# Patient Record
Sex: FEMALE | Race: WHITE | NOT HISPANIC OR LATINO | Employment: OTHER | URBAN - METROPOLITAN AREA
[De-identification: names, ages, dates, MRNs, and addresses within clinical notes are randomized per-mention and may not be internally consistent; named-entity substitution may affect disease eponyms.]

---

## 2017-02-15 ENCOUNTER — TELEPHONE (OUTPATIENT)
Dept: FAMILY MEDICINE | Facility: OTHER | Age: 52
End: 2017-02-15

## 2017-02-15 NOTE — TELEPHONE ENCOUNTER
Summary:    Patient is due/failing the following:   COLONOSCOPY and MAMMOGRAM    Action needed:   Schedule a mammogram and colonoscopy or complete a FIT test    Type of outreach:    phone no answer or voicemail. Reminder letter sent.     Questions for provider review:    None                                                                                                                                    Tawana Madden       Chart routed to Care Team .        Panel Management Review      Patient has the following on her problem list: None      Composite cancer screening  Chart review shows that this patient is due/due soon for the following Mammogram and Colonoscopy

## 2017-02-15 NOTE — LETTER
[facility]  [address]   [facility] [address]  Phone: [phone]  February 15, 2017      Chelo Lopez  [address] [place] [address]      Dear Chelo,    We care about your health and have reviewed your health plan including your medical conditions, medications, and lab results.  Based on this review, it is recommended that you follow up regarding the following health topic(s):  -Breast Cancer Screening  -Colon Cancer Screening    We recommend you take the following action(s):  -schedule a MAMMOGRAM which is due. Please disregard this reminder if you have had this exam elsewhere within the last 1-2 years please let us know so we can update your records.  -schedule a COLONOSCOPY to look for colon cancer (due every 10 years or 5 years in higher risk situations.)  Colonoscopies can prevent 90-95% of colon cancer deaths.  Problem lesions can be removed before they ever become cancer.  If you do not wish to do a colonoscopy or cannot afford to do one at this time, there is another option called a Fecal Immunochemical Occult Blood Test (FIT) a take home stool sample kit.  It does not replace the colonoscopy for colorectal cancer screening, but it can detect hidden bleeding in the lower colon.  It does need to be repeated every year and if a positive result is obtained, you would be referred for a colonoscopy.  If you have completed either one of these tests at another facility, please have the records sent to our clinic for our records.     Please call us at the [facility] - [phone] (or use [employer]) to address the above recommendations.     Thank you for trusting [facility] and we appreciate the opportunity to serve you.  We look forward to supporting your healthcare needs in the future.    Healthy Regards,    Your Health Care Team  [facility]

## 2017-05-08 NOTE — PROGRESS NOTES
"  SUBJECTIVE:                                                    Chelo Lopez is a 52 year old female who presents to clinic today for the following health issues:  {Provider please address medication reconciliation discrepancies--rooming staff please delete if no med/rec issues}    HPI    {additional problems for roomer to add, delete if none:389017}    Problem list and histories reviewed & adjusted, as indicated.  Additional history: {NONE - AS DOCUMENTED:454883::\"as documented\"}    {ACUTE Problem SUPERLIST - brief histories:031232}    {HIST REVIEW/ LINKS 2:722297}    {PROVIDER CHARTING PREFERENCE:066611}  "

## 2017-05-10 ENCOUNTER — OFFICE VISIT (OUTPATIENT)
Dept: FAMILY MEDICINE | Facility: OTHER | Age: 52
End: 2017-05-10
Payer: MEDICAID

## 2017-05-10 VITALS
OXYGEN SATURATION: 97 % | HEIGHT: 65 IN | HEART RATE: 74 BPM | TEMPERATURE: 98.8 F | WEIGHT: 169 LBS | RESPIRATION RATE: 16 BRPM | SYSTOLIC BLOOD PRESSURE: 108 MMHG | BODY MASS INDEX: 28.16 KG/M2 | DIASTOLIC BLOOD PRESSURE: 62 MMHG

## 2017-05-10 DIAGNOSIS — F90.2 ATTENTION DEFICIT HYPERACTIVITY DISORDER (ADHD), COMBINED TYPE: Primary | ICD-10-CM

## 2017-05-10 DIAGNOSIS — Z12.31 VISIT FOR SCREENING MAMMOGRAM: ICD-10-CM

## 2017-05-10 DIAGNOSIS — Z12.11 SCREEN FOR COLON CANCER: ICD-10-CM

## 2017-05-10 PROCEDURE — 99214 OFFICE O/P EST MOD 30 MIN: CPT | Performed by: FAMILY MEDICINE

## 2017-05-10 RX ORDER — IBUPROFEN 600 MG/1
TABLET, FILM COATED ORAL
COMMUNITY
Start: 2017-05-05 | End: 2017-06-28

## 2017-05-10 RX ORDER — POLYETHYLENE GLYCOL 3350 17 G/17G
POWDER, FOR SOLUTION ORAL
COMMUNITY
Start: 2017-05-05 | End: 2017-06-28

## 2017-05-10 RX ORDER — OXYCODONE HYDROCHLORIDE 5 MG/1
TABLET ORAL
COMMUNITY
Start: 2017-05-05 | End: 2017-06-28

## 2017-05-10 RX ORDER — MINERAL OIL/PETROLATUM,WHITE 41.5-56.8%
OINTMENT (GRAM) OPHTHALMIC (EYE)
COMMUNITY
Start: 2017-05-05 | End: 2017-06-28

## 2017-05-10 RX ORDER — DEXTROAMPHETAMINE SACCHARATE, AMPHETAMINE ASPARTATE MONOHYDRATE, DEXTROAMPHETAMINE SULFATE AND AMPHETAMINE SULFATE 7.5; 7.5; 7.5; 7.5 MG/1; MG/1; MG/1; MG/1
30 CAPSULE, EXTENDED RELEASE ORAL DAILY
Qty: 30 CAPSULE | Refills: 0 | Status: SHIPPED | OUTPATIENT
Start: 2017-05-10 | End: 2017-05-31

## 2017-05-10 ASSESSMENT — PAIN SCALES - GENERAL: PAINLEVEL: NO PAIN (0)

## 2017-05-10 NOTE — MR AVS SNAPSHOT
"              After Visit Summary   5/10/2017    Chelo Lopez    MRN: [id number]           Patient Information     Date Of Birth          1965        Visit Information        Provider Department      5/10/2017 4:45 PM More Lao MD [facility]        Today's Diagnoses     Attention deficit hyperactivity disorder (ADHD), combined type    -  1    Screen for colon cancer        Visit for screening mammogram           Follow-ups after your visit        Who to contact     If you have questions or need follow up information about today's clinic visit or your schedule please contact [facility] directly at [phone].  Normal or non-critical lab and imaging results will be communicated to you by [employer], letter or phone within 4 business days after the clinic has received the results. If you do not hear from us within 7 days, please contact the clinic through [employer] or phone. If you have a critical or abnormal lab result, we will notify you by phone as soon as possible.  Submit refill requests through [employer] or call your pharmacy and they will forward the refill request to us. Please allow 3 business days for your refill to be completed.          Additional Information About Your Visit        MyChart Information     [employer] gives you secure access to your electronic health record. If you see a primary care provider, you can also send messages to your care team and make appointments. If you have questions, please call your primary care clinic.  If you do not have a primary care provider, please call [phone] and they will assist you.        Care EveryWhere ID     This is your Care EveryWhere ID. This could be used by other organizations to access your [place] medical records  [id number]        Your Vitals Were     Pulse Temperature Respirations Height Last Period Pulse Oximetry    74 98.8  F (37.1  C) (Temporal) 16 5' 4.53\" (1.639 m) 03/07/2011 97%    BMI (Body Mass " Index)                   28.54 kg/m2            Blood Pressure from Last 3 Encounters:   05/10/17 108/62   09/19/16 94/60   04/27/16 106/70    Weight from Last 3 Encounters:   05/10/17 169 lb (76.7 kg)   09/19/16 164 lb (74.4 kg)   04/27/16 163 lb (73.9 kg)              Today, you had the following     No orders found for display         Today's Medication Changes          These changes are accurate as of: 5/10/17  5:18 PM.  If you have any questions, ask your nurse or doctor.               These medicines have changed or have updated prescriptions.        Dose/Directions    * amphetamine-dextroamphetamine 20 MG per tablet   Commonly known as:  ADDERALL   This may have changed:    - Another medication with the same name was added. Make sure you understand how and when to take each.  - Another medication with the same name was removed. Continue taking this medication, and follow the directions you see here.   Changed by:  More Lao MD        Dose:  20 mg   Take 1 tablet (20 mg) by mouth daily   Quantity:  10 tablet   Refills:  0       * amphetamine-dextroamphetamine 30 MG per 24 hr capsule   Commonly known as:  ADDERALL XR   This may have changed:  You were already taking a medication with the same name, and this prescription was added. Make sure you understand how and when to take each.   Used for:  Attention deficit hyperactivity disorder (ADHD), combined type   Replaces:  amphetamine-dextroamphetamine 10 MG per tablet   Changed by:  More Lao MD        Dose:  30 mg   Take 1 capsule (30 mg) by mouth daily   Quantity:  30 capsule   Refills:  0       * Notice:  This list has 2 medication(s) that are the same as other medications prescribed for you. Read the directions carefully, and ask your doctor or other care provider to review them with you.      Stop taking these medicines if you haven't already. Please contact your care team if you have questions.     amphetamine-dextroamphetamine 10 MG per  tablet   Commonly known as:  ADDERALL   Replaced by:  amphetamine-dextroamphetamine 30 MG per 24 hr capsule   You also have another medication with the same name that you need to continue taking as instructed.   Stopped by:  More Lao MD                Where to get your medicines      Some of these will need a paper prescription and others can be bought over the counter.  Ask your nurse if you have questions.     Bring a paper prescription for each of these medications     amphetamine-dextroamphetamine 30 MG per 24 hr capsule                Primary Care Provider Office Phone # Fax #    More Lao [phone]       [facility]  [address] [facility] [address]        Thank you!     Thank you for choosing [facility]  for your care. Our goal is always to provide you with excellent care. Hearing back from our patients is one way we can continue to improve our services. Please take a few minutes to complete the written survey that you may receive in the mail after your visit with us. Thank you!             Your Updated Medication List - Protect others around you: Learn how to safely use, store and throw away your medicines at www.disposemymeds.org.          This list is accurate as of: 5/10/17  5:18 PM.  Always use your most recent med list.                   Brand Name Dispense Instructions for use    * amphetamine-dextroamphetamine 20 MG per tablet    ADDERALL    10 tablet    Take 1 tablet (20 mg) by mouth daily       * amphetamine-dextroamphetamine 30 MG per 24 hr capsule    ADDERALL XR    30 capsule    Take 1 capsule (30 mg) by mouth daily       ibuprofen 600 MG tablet    ADVIL/MOTRIN         MAPAP 500 MG tablet   Generic drug:  acetaminophen          oxyCODONE 5 MG IR tablet    ROXICODONE         polyethylene glycol powder    MIRALAX/GLYCOLAX         SENNA-S 8.6-50 MG per tablet   Generic drug:  senna-docusate          * Notice:  This list has 2  medication(s) that are the same as other medications prescribed for you. Read the directions carefully, and ask your doctor or other care provider to review them with you.

## 2017-05-10 NOTE — PROGRESS NOTES
SUBJECTIVE:  Chelo is here today to recheck ADHD/ADD.    Updates since last visit: Haven't taken medication for a while due to no insurance (about 3-4 months)    Routine for taking medicine, including time: Not taking now  Time medicine wears off: Not taking  Issues at school: N/A  Issues at home: None  Control of symptoms: No    Side effects:  Headaches: N/A  Stomach aches: N/A  Irritability/mood swings: N/A  Difficulties with sleep: N/A  Social withdrawal: N/A  Unusual movements/tics: N/A  Decreased appetite: N/A      Other concerns: None    Patient Active Problem List   Diagnosis     Other disorder of menstruation and other abnormal bleeding from female genital tract     Tobacco use disorder     PMDD (premenstrual dysphoric disorder)     Menorrhagia     CARDIOVASCULAR SCREENING; LDL GOAL LESS THAN 160     ADHD (attention deficit hyperactivity disorder)     Osteoarthritis of hip       Past Medical History:   Diagnosis Date     Other disorder of menstruation and other abnormal bleeding from female genital tract      Tobacco use disorder        Past Surgical History:   Procedure Laterality Date     ARTHROPLASTY HIP  5/2/2012    Procedure:ARTHROPLASTY HIP; Right Total Hip Arthroplasty; Surgeon:DAGOBERTO CAMPBELL; [facility]     ARTHROPLASTY HIP Left 1/12/2015    Procedure: ARTHROPLASTY HIP;  Surgeon: Dagoberto Campbell MD;  Location:  OR      NONSPECIFIC PROCEDURE  2001    cervical cone procedure?     C NONSPECIFIC PROCEDURE      rt salpingectomy (uncertain if oophorectomy)     COSMETIC MAMMOPLASTY AUGMENTATION BILATERAL      implants     CYSTOSCOPY  3/14/2011    CYSTOSCOPY performed by RICHARD MACKEY at  OR     HYSTERECTOMY  2011    menorrhagia     HYSTERECTOMY, PAP NO LONGER INDICATED       LAPAROSCOPIC ASSISTED HYSTERECTOMY VAGINAL  3/14/2011    LAPAROSCOPIC ASSISTED HYSTERECTOMY VAGINAL performed by RICHARD MACKEY at  OR     LAPAROSCOPY DIAGNOSTIC (GYN)  3/14/2011    LAPAROSCOPY DIAGNOSTIC  "(GYN) performed by RICHARD MACKEY at  OR     SURGICAL HISTORY OF -   1985    Laparotomy for the fallopian tube abscess        Current Outpatient Prescriptions   Medication     MAPAP 500 MG tablet     ibuprofen (ADVIL/MOTRIN) 600 MG tablet     oxyCODONE (ROXICODONE) 5 MG IR tablet     polyethylene glycol (MIRALAX/GLYCOLAX) powder     SENNA-S 8.6-50 MG per tablet     amphetamine-dextroamphetamine (ADDERALL XR) 30 MG per 24 hr capsule     [CANCELED] amphetamine-dextroamphetamine (ADDERALL) 20 MG tablet     [DISCONTINUED] amphetamine-dextroamphetamine (ADDERALL) 10 MG tablet     [DISCONTINUED] amphetamine-dextroamphetamine (ADDERALL) 20 MG tablet     No current facility-administered medications for this visit.        OBJECTIVE:  /62 (BP Location: Right arm, Patient Position: Chair, Cuff Size: Adult Regular)  Pulse 74  Temp 98.8  F (37.1  C) (Temporal)  Resp 16  Ht 5' 4.53\" (1.639 m)  Wt 169 lb (76.7 kg)  LMP 03/07/2011  SpO2 97%  BMI 28.54 kg/m2  Gen: alert, awake, NAD  Lungs: CTA russ  Heart: RR without murmur  Psych: mood normal, focus poor and easily off topic      ASSESSMENT:  1. Attention deficit hyperactivity disorder (ADHD), combined type    2. Screen for colon cancer    3. Visit for screening mammogram        PLAN:  Patient Instructions   -use a timer/alarm for your medication.  -30 XR, follow up in a month.    Discussed another trial of XR as this would better cover for her full day.  Would do well with reminder to help with control.  As this is a med restart, needs to f/u in 1 month.  F/u scheduled today.      The information in this document, created by the medical scribe for me, accurately reflects the services I personally performed and the decisions made by me. I have reviewed and approved this document for accuracy.   MD More Escalante MD      "

## 2017-05-10 NOTE — NURSING NOTE
"Chief Complaint   Patient presents with     Recheck Medication     adderall     Panel Management     mammo, colon cancer, height       Initial /62 (BP Location: Right arm, Patient Position: Chair, Cuff Size: Adult Regular)  Pulse 74  Temp 98.8  F (37.1  C) (Temporal)  Resp 16  Ht 5' 4.53\" (1.639 m)  Wt 169 lb (76.7 kg)  LMP 03/07/2011  SpO2 97%  BMI 28.54 kg/m2 Estimated body mass index is 28.54 kg/(m^2) as calculated from the following:    Height as of this encounter: 5' 4.53\" (1.639 m).    Weight as of this encounter: 169 lb (76.7 kg).  Medication Reconciliation: complete   Imani Vera CMA      "

## 2017-05-25 NOTE — PROGRESS NOTES
SUBJECTIVE:                                                    Chelo Lopez is a 52 year old female who presents to clinic today for the following health issues:      HPI  SUBJECTIVE:  Chelo is here today to recheck ADHD/ADD.    Updates since last visit: everything     Routine for taking medicine, including time: 10:00am  Time medicine wears off: unsure, gradually wears off  Issues at school: no  Issues at home: no  Control of symptoms: not sure    Side effects:  Headaches: No  Stomach aches: No  Irritability/mood swings: Yes- not as excited or creative about certain activities.  Difficulties with sleep: Yes -Is not feeling rested in the morning, sleep is poor due to broken ribs.  Social withdrawal: No  Unusual movements/tics: No  Decreased appetite: No    Other concerns: The patient is still experiencing pain with her broken ribs.    Patient Active Problem List   Diagnosis     Other disorder of menstruation and other abnormal bleeding from female genital tract     Tobacco use disorder     PMDD (premenstrual dysphoric disorder)     Menorrhagia     CARDIOVASCULAR SCREENING; LDL GOAL LESS THAN 160     ADHD (attention deficit hyperactivity disorder)     Osteoarthritis of hip       Past Medical History:   Diagnosis Date     Other disorder of menstruation and other abnormal bleeding from female genital tract      Tobacco use disorder        Past Surgical History:   Procedure Laterality Date     ARTHROPLASTY HIP  5/2/2012    Procedure:ARTHROPLASTY HIP; Right Total Hip Arthroplasty; Surgeon:DAGOBERTO CAMPBELL; [facility]     ARTHROPLASTY HIP Left 1/12/2015    Procedure: ARTHROPLASTY HIP;  Surgeon: Dagoberto Campbell MD;  Location:  OR      NONSPECIFIC PROCEDURE  2001    cervical cone procedure?     C NONSPECIFIC PROCEDURE      rt salpingectomy (uncertain if oophorectomy)     COSMETIC MAMMOPLASTY AUGMENTATION BILATERAL      implants     CYSTOSCOPY  3/14/2011    CYSTOSCOPY performed by [facility]ARD MACKEY at  [facility]     HYSTERECTOMY  2011    menorrhagia     HYSTERECTOMY, PAP NO LONGER INDICATED       LAPAROSCOPIC ASSISTED HYSTERECTOMY VAGINAL  3/14/2011    LAPAROSCOPIC ASSISTED HYSTERECTOMY VAGINAL performed by RICHARD MACKEY at  OR     LAPAROSCOPY DIAGNOSTIC (GYN)  3/14/2011    LAPAROSCOPY DIAGNOSTIC (GYN) performed by RICHARD MACKEY at  OR     SURGICAL HISTORY OF -   1985    Laparotomy for the fallopian tube abscess        Current Outpatient Prescriptions   Medication     amphetamine-dextroamphetamine (ADDERALL XR) 20 MG per 24 hr capsule     MAPAP 500 MG tablet     ibuprofen (ADVIL/MOTRIN) 600 MG tablet     oxyCODONE (ROXICODONE) 5 MG IR tablet     polyethylene glycol (MIRALAX/GLYCOLAX) powder     SENNA-S 8.6-50 MG per tablet     [DISCONTINUED] amphetamine-dextroamphetamine (ADDERALL XR) 30 MG per 24 hr capsule     [CANCELED] amphetamine-dextroamphetamine (ADDERALL) 20 MG tablet     No current facility-administered medications for this visit.        OBJECTIVE:  /86 (BP Location: Left arm, Patient Position: Chair, Cuff Size: Adult Regular)  Pulse 76  Temp 98.7  F (37.1  C) (Temporal)  Resp 16  Ht 1.638 m (5' 4.5\")  Wt 76.2 kg (168 lb)  LMP 03/07/2011  BMI 28.39 kg/m2  Gen: alert, awake, NAD  Lungs: CTA russ  Heart: RR without murmur  PSYCH: Little bit of fatigue and decreased creativity and focus noted today. Alert and oriented times 3; speech- coherent , normal rate and volume; able to articulate logical thoughts, able to abstract reason, no tangential thoughts, no hallucinations or delusions, affect- normal     ASSESSMENT:  1. Attention deficit hyperactivity disorder (ADHD), combined type    2. Screen for colon cancer    3. PMDD (premenstrual dysphoric disorder)    4. Tobacco use disorder      Still not finding improvement in focus with these meds.  Mostly she complains of \"not feeling\" the meds as they go on and off.  Also mentions many projects she is having trouble completing.  Will " increase dose to try to help.  She is still not sure about this long acting med choice, but admits she has trouble remembering BID and she has been waking at night due to pain with the rib fractures, so lack of sleep may be impacting focus.    PLAN:  Patient Instructions   -Follow up in a month.      The information in this document, created by the medical scribe for me, accurately reflects the services I personally performed and the decisions made by me. I have reviewed and approved this document for accuracy.   MD More Escalante MD

## 2017-05-31 ENCOUNTER — OFFICE VISIT (OUTPATIENT)
Dept: FAMILY MEDICINE | Facility: OTHER | Age: 52
End: 2017-05-31
Payer: MEDICAID

## 2017-05-31 VITALS
HEART RATE: 76 BPM | WEIGHT: 168 LBS | RESPIRATION RATE: 16 BRPM | DIASTOLIC BLOOD PRESSURE: 86 MMHG | TEMPERATURE: 98.7 F | SYSTOLIC BLOOD PRESSURE: 132 MMHG | HEIGHT: 65 IN | BODY MASS INDEX: 27.99 KG/M2

## 2017-05-31 DIAGNOSIS — Z12.11 SCREEN FOR COLON CANCER: ICD-10-CM

## 2017-05-31 DIAGNOSIS — F32.81 PMDD (PREMENSTRUAL DYSPHORIC DISORDER): ICD-10-CM

## 2017-05-31 DIAGNOSIS — F17.200 TOBACCO USE DISORDER: ICD-10-CM

## 2017-05-31 DIAGNOSIS — F90.2 ATTENTION DEFICIT HYPERACTIVITY DISORDER (ADHD), COMBINED TYPE: Primary | ICD-10-CM

## 2017-05-31 PROCEDURE — 99214 OFFICE O/P EST MOD 30 MIN: CPT | Performed by: FAMILY MEDICINE

## 2017-05-31 RX ORDER — DEXTROAMPHETAMINE SACCHARATE, AMPHETAMINE ASPARTATE MONOHYDRATE, DEXTROAMPHETAMINE SULFATE AND AMPHETAMINE SULFATE 5; 5; 5; 5 MG/1; MG/1; MG/1; MG/1
40 CAPSULE, EXTENDED RELEASE ORAL DAILY
Qty: 60 CAPSULE | Refills: 0 | Status: SHIPPED | OUTPATIENT
Start: 2017-05-31 | End: 2017-06-28

## 2017-05-31 ASSESSMENT — PAIN SCALES - GENERAL: PAINLEVEL: NO PAIN (0)

## 2017-05-31 NOTE — NURSING NOTE
"Chief Complaint   Patient presents with     Recheck Medication     adderall     Panel Management     Colonoscopy       Initial /86 (BP Location: Left arm, Patient Position: Chair, Cuff Size: Adult Regular)  Pulse 76  Temp 98.7  F (37.1  C) (Temporal)  Resp 16  Ht 5' 4.5\" (1.638 m)  Wt 168 lb (76.2 kg)  LMP 03/07/2011  BMI 28.39 kg/m2 Estimated body mass index is 28.39 kg/(m^2) as calculated from the following:    Height as of this encounter: 5' 4.5\" (1.638 m).    Weight as of this encounter: 168 lb (76.2 kg).  Medication Reconciliation: complete  "

## 2017-05-31 NOTE — MR AVS SNAPSHOT
After Visit Summary   5/31/2017    Chelo Lopez    MRN: [id number]           Patient Information     Date Of Birth          1965        Visit Information        Provider Department      5/31/2017 2:30 PM More Lao MD [facility]        Today's Diagnoses     Attention deficit hyperactivity disorder (ADHD), combined type    -  1    Screen for colon cancer        PMDD (premenstrual dysphoric disorder)        Tobacco use disorder          Care Instructions    -Follow up in a month.          Follow-ups after your visit        Additional Services     GASTROENTEROLOGY ADULT REF PROCEDURE ONLY       Last Lab Result: Creatinine (mg/dL)       Date                     Value                 01/15/2015               0.74             ----------  There is no height or weight on file to calculate BMI.     Needed:  No  Language:  English    Patient will be contacted to schedule procedure.     Please be aware that coverage of these services is subject to the terms and limitations of your health insurance plan.  Call member services at your health plan with any benefit or coverage questions.  Any procedures must be performed at a [facility] facility OR coordinated by your clinic's referral office.    Please bring the following with you to your appointment:    (1) Any X-Rays, CTs or MRIs which have been performed.  Contact the facility where they were done to arrange for  prior to your scheduled appointment.    (2) List of current medications   (3) This referral request   (4) Any documents/labs given to you for this referral                  Your next 10 appointments already scheduled     Jun 28, 2017  2:30 PM CDT   Office Visit with More Lao MD   [facility]    [address] [place] [id number]   [phone]           Bring a current list of meds and any records pertaining to this visit.  For Physicals,  "please bring immunization records and any forms needing to be filled out.  Please arrive 10 minutes early to complete paperwork.              Who to contact     If you have questions or need follow up information about today's clinic visit or your schedule please contact [facility] directly at [phone].  Normal or non-critical lab and imaging results will be communicated to you by MyChart, letter or phone within 4 business days after the clinic has received the results. If you do not hear from us within 7 days, please contact the clinic through WatchFroghart or phone. If you have a critical or abnormal lab result, we will notify you by phone as soon as possible.  Submit refill requests through Agilvax or call your pharmacy and they will forward the refill request to us. Please allow 3 business days for your refill to be completed.          Additional Information About Your Visit        MyChart Information     Agilvax gives you secure access to your electronic health record. If you see a primary care provider, you can also send messages to your care team and make appointments. If you have questions, please call your primary care clinic.  If you do not have a primary care provider, please call [phone] and they will assist you.        Care EveryWhere ID     This is your Care EveryWhere ID. This could be used by other organizations to access your [facility] medical records  [id number]        Your Vitals Were     Pulse Temperature Respirations Height Last Period BMI (Body Mass Index)    76 98.7  F (37.1  C) (Temporal) 16 5' 4.5\" (1.638 m) 03/07/2011 28.39 kg/m2       Blood Pressure from Last 3 Encounters:   05/31/17 132/86   05/10/17 108/62   09/19/16 94/60    Weight from Last 3 Encounters:   05/31/17 168 lb (76.2 kg)   05/10/17 169 lb (76.7 kg)   09/19/16 164 lb (74.4 kg)              We Performed the Following     GASTROENTEROLOGY ADULT REF PROCEDURE ONLY          Today's Medication Changes        "   These changes are accurate as of: 5/31/17  3:23 PM.  If you have any questions, ask your nurse or doctor.               These medicines have changed or have updated prescriptions.        Dose/Directions    * amphetamine-dextroamphetamine 20 MG per tablet   Commonly known as:  ADDERALL   This may have changed:    - Another medication with the same name was added. Make sure you understand how and when to take each.  - Another medication with the same name was removed. Continue taking this medication, and follow the directions you see here.   Changed by:  More Lao MD        Dose:  20 mg   Take 1 tablet (20 mg) by mouth daily   Quantity:  10 tablet   Refills:  0       * amphetamine-dextroamphetamine 20 MG per 24 hr capsule   Commonly known as:  ADDERALL XR   This may have changed:  You were already taking a medication with the same name, and this prescription was added. Make sure you understand how and when to take each.   Used for:  Attention deficit hyperactivity disorder (ADHD), combined type   Replaces:  amphetamine-dextroamphetamine 30 MG per 24 hr capsule   Changed by:  More Lao MD        Dose:  40 mg   Take 2 capsules (40 mg) by mouth daily   Quantity:  60 capsule   Refills:  0       * Notice:  This list has 2 medication(s) that are the same as other medications prescribed for you. Read the directions carefully, and ask your doctor or other care provider to review them with you.      Stop taking these medicines if you haven't already. Please contact your care team if you have questions.     amphetamine-dextroamphetamine 30 MG per 24 hr capsule   Commonly known as:  ADDERALL XR   Replaced by:  amphetamine-dextroamphetamine 20 MG per 24 hr capsule   You also have another medication with the same name that you need to continue taking as instructed.   Stopped by:  More Lao MD                Where to get your medicines      Some of these will need a paper prescription and others can be  bought over the counter.  Ask your nurse if you have questions.     Bring a paper prescription for each of these medications     amphetamine-dextroamphetamine 20 MG per 24 hr capsule                Primary Care Provider Office Phone # Fax #    More Lao [phone]       [facility]  [address] [facility] [address]        Thank you!     Thank you for choosing [facility]  for your care. Our goal is always to provide you with excellent care. Hearing back from our patients is one way we can continue to improve our services. Please take a few minutes to complete the written survey that you may receive in the mail after your visit with us. Thank you!             Your Updated Medication List - Protect others around you: Learn how to safely use, store and throw away your medicines at www.disposemymeds.org.          This list is accurate as of: 5/31/17  3:23 PM.  Always use your most recent med list.                   Brand Name Dispense Instructions for use    * amphetamine-dextroamphetamine 20 MG per tablet    ADDERALL    10 tablet    Take 1 tablet (20 mg) by mouth daily       * amphetamine-dextroamphetamine 20 MG per 24 hr capsule    ADDERALL XR    60 capsule    Take 2 capsules (40 mg) by mouth daily       ibuprofen 600 MG tablet    ADVIL/MOTRIN         MAPAP 500 MG tablet   Generic drug:  acetaminophen          oxyCODONE 5 MG IR tablet    ROXICODONE         polyethylene glycol powder    MIRALAX/GLYCOLAX         SENNA-S 8.6-50 MG per tablet   Generic drug:  senna-docusate          * Notice:  This list has 2 medication(s) that are the same as other medications prescribed for you. Read the directions carefully, and ask your doctor or other care provider to review them with you.

## 2017-06-26 NOTE — PROGRESS NOTES
SUBJECTIVE:                                                    Chelo Lopez is a 52 year old female who presents to clinic today for the following health issues:    HPI  SUBJECTIVE:  Chelo is here today to recheck ADHD/ADD.    Updates since last visit: Patient states she feels like this new dose two 20mg tablets daily is working better.    Routine for taking medicine, including time: Patient takes the two 20 mg tablets around 10 am every morning.  Time medicine wears off: Patient states she can't really tell. She thinks around 8 or 9 pm.  Issues at home: none  Control of symptoms: Working better    Side effects:  Headaches: No  Stomach aches: No  Irritability/mood swings: No  Difficulties with sleep: No  Social withdrawal: No  Unusual movements/tics: No  Decreased appetite: No    Other concerns: none    PSYCH: The patient reports that her increased Adderall dosage has been helping improve her ADHD symptoms.    Patient Active Problem List   Diagnosis     Other disorder of menstruation and other abnormal bleeding from female genital tract     Tobacco use disorder     PMDD (premenstrual dysphoric disorder)     Menorrhagia     CARDIOVASCULAR SCREENING; LDL GOAL LESS THAN 160     ADHD (attention deficit hyperactivity disorder)     Osteoarthritis of hip       Past Medical History:   Diagnosis Date     Other disorder of menstruation and other abnormal bleeding from female genital tract      Tobacco use disorder        Past Surgical History:   Procedure Laterality Date     ARTHROPLASTY HIP  5/2/2012    Procedure:ARTHROPLASTY HIP; Right Total Hip Arthroplasty; Surgeon:DAGOBERTO CAMPBELL; [facility]     ARTHROPLASTY HIP Left 1/12/2015    Procedure: ARTHROPLASTY HIP;  Surgeon: Dagoberto Campbell MD;  Location:  OR      NONSPECIFIC PROCEDURE  2001    cervical cone procedure?     C NONSPECIFIC PROCEDURE      rt salpingectomy (uncertain if oophorectomy)     COSMETIC MAMMOPLASTY AUGMENTATION BILATERAL      implants  "    CYSTOSCOPY  3/14/2011    CYSTOSCOPY performed by RICHARD MACKEY at  OR     HYSTERECTOMY  2011    menorrhagia     HYSTERECTOMY, PAP NO LONGER INDICATED       LAPAROSCOPIC ASSISTED HYSTERECTOMY VAGINAL  3/14/2011    LAPAROSCOPIC ASSISTED HYSTERECTOMY VAGINAL performed by RICHARD MACKEY at  OR     LAPAROSCOPY DIAGNOSTIC (GYN)  3/14/2011    LAPAROSCOPY DIAGNOSTIC (GYN) performed by RICHARD MACKEY at  OR     SURGICAL HISTORY OF -   1985    Laparotomy for the fallopian tube abscess        Current Outpatient Prescriptions   Medication     amphetamine-dextroamphetamine (ADDERALL XR) 20 MG per 24 hr capsule     [DISCONTINUED] amphetamine-dextroamphetamine (ADDERALL XR) 20 MG per 24 hr capsule     MAPAP 500 MG tablet     ibuprofen (ADVIL/MOTRIN) 600 MG tablet     oxyCODONE (ROXICODONE) 5 MG IR tablet     polyethylene glycol (MIRALAX/GLYCOLAX) powder     SENNA-S 8.6-50 MG per tablet     [CANCELED] amphetamine-dextroamphetamine (ADDERALL) 20 MG tablet     No current facility-administered medications for this visit.        OBJECTIVE:  BP 96/64  Pulse 102  Temp 98.5  F (36.9  C) (Temporal)  Resp 16  Ht 1.638 m (5' 4.5\")  Wt 73.9 kg (163 lb)  LMP 03/07/2011  Breastfeeding? No  BMI 27.55 kg/m2  Gen: alert, awake, NAD  Lungs: CTA russ  Heart: RR without murmur  PSYCH: Alert and oriented times 3; speech- coherent , normal rate and volume; able to articulate logical thoughts, able to abstract reason, no tangential thoughts, no hallucinations or delusions, affect- normal    More Lao MD    Problem list and histories reviewed & adjusted, as indicated.  Additional history: as documented    Patient Active Problem List   Diagnosis     Other disorder of menstruation and other abnormal bleeding from female genital tract     Tobacco use disorder     PMDD (premenstrual dysphoric disorder)     Menorrhagia     CARDIOVASCULAR SCREENING; LDL GOAL LESS THAN 160     ADHD (attention deficit hyperactivity disorder)     " Osteoarthritis of hip     Past Surgical History:   Procedure Laterality Date     ARTHROPLASTY HIP  5/2/2012    Procedure:ARTHROPLASTY HIP; Right Total Hip Arthroplasty; Surgeon:DAGOBERTO CAMPBELL; [facility]     ARTHROPLASTY HIP Left 1/12/2015    Procedure: ARTHROPLASTY HIP;  Surgeon: Dagoberto Campbell MD;  [facility]     C NONSPECIFIC PROCEDURE  2001    cervical cone procedure?     C NONSPECIFIC PROCEDURE      rt salpingectomy (uncertain if oophorectomy)     COSMETIC MAMMOPLASTY AUGMENTATION BILATERAL      implants     CYSTOSCOPY  3/14/2011    CYSTOSCOPY performed by RICHARD MACKEY at  OR     HYSTERECTOMY  2011    menorrhagia     HYSTERECTOMY, PAP NO LONGER INDICATED       LAPAROSCOPIC ASSISTED HYSTERECTOMY VAGINAL  3/14/2011    LAPAROSCOPIC ASSISTED HYSTERECTOMY VAGINAL performed by RICHARD MACKEY at  OR     LAPAROSCOPY DIAGNOSTIC (GYN)  3/14/2011    LAPAROSCOPY DIAGNOSTIC (GYN) performed by RICHARD MACKEY at  OR     SURGICAL HISTORY OF -   1985    Laparotomy for the fallopian tube abscess        Social History   Substance Use Topics     Smoking status: Current Every Day Smoker     Packs/day: 0.50     Types: Cigarettes     Smokeless tobacco: Never Used      Comment: 1/2 pack weekly-socially only     Alcohol use Yes      Comment: 4 weekly     Family History   Problem Relation Age of Onset     CEREBROVASCULAR DISEASE Father      CANCER Sister      skin     C.A.D. No family hx of      Breast Cancer No family hx of      Cancer - colorectal No family hx of            ASSESSMENT/PLAN:                                                        ICD-10-CM    1. Attention deficit hyperactivity disorder (ADHD), combined type F90.2 amphetamine-dextroamphetamine (ADDERALL XR) 20 MG per 24 hr capsule     Pain Drug Scr UR W Rptd Meds   2. Screen for colon cancer Z12.11 Fecal colorectal cancer screen (FIT)     -Discussed colon cancer screen, the patient will do a FIT test.  -Pt reports that her ADHD is  stable with her current Adderall dosage, no change in medication at the moment. I answered questions regarding longer acting medication.  -3 mo F/U    Patient Instructions   Follow up in 3 months.    The information in this document, created by the medical scribe for me, accurately reflects the services I personally performed and the decisions made by me. I have reviewed and approved this document for accuracy.   MD More Escalante MD, MD  [facility]

## 2017-06-28 ENCOUNTER — OFFICE VISIT (OUTPATIENT)
Dept: FAMILY MEDICINE | Facility: OTHER | Age: 52
End: 2017-06-28
Payer: MEDICAID

## 2017-06-28 VITALS
WEIGHT: 163 LBS | RESPIRATION RATE: 16 BRPM | HEART RATE: 102 BPM | TEMPERATURE: 98.5 F | BODY MASS INDEX: 27.16 KG/M2 | DIASTOLIC BLOOD PRESSURE: 64 MMHG | HEIGHT: 65 IN | SYSTOLIC BLOOD PRESSURE: 96 MMHG

## 2017-06-28 DIAGNOSIS — Z79.899 ENCOUNTER FOR LONG-TERM (CURRENT) USE OF MEDICATIONS: ICD-10-CM

## 2017-06-28 DIAGNOSIS — F90.2 ATTENTION DEFICIT HYPERACTIVITY DISORDER (ADHD), COMBINED TYPE: Primary | ICD-10-CM

## 2017-06-28 DIAGNOSIS — Z12.11 SCREEN FOR COLON CANCER: ICD-10-CM

## 2017-06-28 PROCEDURE — 80307 DRUG TEST PRSMV CHEM ANLYZR: CPT | Mod: 90 | Performed by: FAMILY MEDICINE

## 2017-06-28 PROCEDURE — 99214 OFFICE O/P EST MOD 30 MIN: CPT | Performed by: FAMILY MEDICINE

## 2017-06-28 RX ORDER — DEXTROAMPHETAMINE SACCHARATE, AMPHETAMINE ASPARTATE MONOHYDRATE, DEXTROAMPHETAMINE SULFATE AND AMPHETAMINE SULFATE 5; 5; 5; 5 MG/1; MG/1; MG/1; MG/1
40 CAPSULE, EXTENDED RELEASE ORAL DAILY
Qty: 60 CAPSULE | Refills: 0 | Status: SHIPPED | OUTPATIENT
Start: 2017-06-28 | End: 2017-07-10

## 2017-06-28 ASSESSMENT — PAIN SCALES - GENERAL: PAINLEVEL: NO PAIN (0)

## 2017-06-28 NOTE — MR AVS SNAPSHOT
After Visit Summary   6/28/2017    Chelo Lopez    MRN: [id number]           Patient Information     Date Of Birth          1965        Visit Information        Provider Department      6/28/2017 2:30 PM More Lao MD [facility]        Today's Diagnoses     Attention deficit hyperactivity disorder (ADHD), combined type    -  1    Screen for colon cancer        Encounter for long-term (current) use of medications          Care Instructions    Follow up in 3 months.          Follow-ups after your visit        Future tests that were ordered for you today     Open Future Orders        Priority Expected Expires Ordered    Fecal colorectal cancer screen (FIT) Routine 7/19/2017 9/20/2017 6/28/2017            Who to contact     If you have questions or need follow up information about today's clinic visit or your schedule please contact [facility] directly at [phone].  Normal or non-critical lab and imaging results will be communicated to you by Brass Monkeyhart, letter or phone within 4 business days after the clinic has received the results. If you do not hear from us within 7 days, please contact the clinic through Brass Monkeyhart or phone. If you have a critical or abnormal lab result, we will notify you by phone as soon as possible.  Submit refill requests through Mitro or call your pharmacy and they will forward the refill request to us. Please allow 3 business days for your refill to be completed.          Additional Information About Your Visit        MyChart Information     Mitro gives you secure access to your electronic health record. If you see a primary care provider, you can also send messages to your care team and make appointments. If you have questions, please call your primary care clinic.  If you do not have a primary care provider, please call [phone] and they will assist you.        Care EveryWhere ID     This is your Care EveryWhere ID. This  "could be used by other organizations to access your [place] medical records  [id number]        Your Vitals Were     Pulse Temperature Respirations Height Last Period Breastfeeding?    102 98.5  F (36.9  C) (Temporal) 16 5' 4.5\" (1.638 m) 03/07/2011 No    BMI (Body Mass Index)                   27.55 kg/m2            Blood Pressure from Last 3 Encounters:   06/28/17 96/64   05/31/17 132/86   05/10/17 108/62    Weight from Last 3 Encounters:   06/28/17 163 lb (73.9 kg)   05/31/17 168 lb (76.2 kg)   05/10/17 169 lb (76.7 kg)              We Performed the Following     Pain Drug Scr UR W Rptd Meds          Today's Medication Changes          These changes are accurate as of: 6/28/17  3:04 PM.  If you have any questions, ask your nurse or doctor.               Stop taking these medicines if you haven't already. Please contact your care team if you have questions.     ibuprofen 600 MG tablet   Commonly known as:  ADVIL/MOTRIN   Stopped by:  More Lao MD           MAPAP 500 MG tablet   Generic drug:  acetaminophen   Stopped by:  More Lao MD           oxyCODONE 5 MG IR tablet   Commonly known as:  ROXICODONE   Stopped by:  More Lao MD           polyethylene glycol powder   Commonly known as:  MIRALAX/GLYCOLAX   Stopped by:  More Lao MD           SENNA-S 8.6-50 MG per tablet   Generic drug:  senna-docusate   Stopped by:  More Lao MD                Where to get your medicines      Some of these will need a paper prescription and others can be bought over the counter.  Ask your nurse if you have questions.     Bring a paper prescription for each of these medications     amphetamine-dextroamphetamine 20 MG per 24 hr capsule                Primary Care Provider Office Phone # Fax #    More aLo [phone]       [facility]  [address] [facility] [address]        Equal Access to Services     LINDA HOWE AH: Marie Garcia, " joseph prieto, kiera quintero, hannah perdomoaajuliette ah. So [facility] [phone].    ATENCIÓN: Si olvin perrin, tiene a andujar disposición servicios gratuitos de asistencia lingüística. Antonio al [phone].    We comply with applicable federal civil rights laws and [place] laws. We do not discriminate on the basis of race, color, national origin, age, disability sex, sexual orientation or gender identity.            Thank you!     Thank you for choosing [facility]  for your care. Our goal is always to provide you with excellent care. Hearing back from our patients is one way we can continue to improve our services. Please take a few minutes to complete the written survey that you may receive in the mail after your visit with us. Thank you!             Your Updated Medication List - Protect others around you: Learn how to safely use, store and throw away your medicines at www.disposemymeds.org.          This list is accurate as of: 6/28/17  3:04 PM.  Always use your most recent med list.                   Brand Name Dispense Instructions for use Diagnosis    * amphetamine-dextroamphetamine 20 MG per tablet    ADDERALL    10 tablet    Take 1 tablet (20 mg) by mouth daily        * amphetamine-dextroamphetamine 20 MG per 24 hr capsule    ADDERALL XR    60 capsule    Take 2 capsules (40 mg) by mouth daily    Attention deficit hyperactivity disorder (ADHD), combined type       * Notice:  This list has 2 medication(s) that are the same as other medications prescribed for you. Read the directions carefully, and ask your doctor or other care provider to review them with you.

## 2017-06-28 NOTE — NURSING NOTE
"Chief Complaint   Patient presents with     A.D.H.D     Panel Management     colonoscopy       Initial BP 96/64  Pulse 102  Temp 98.5  F (36.9  C) (Temporal)  Resp 16  Ht 5' 4.5\" (1.638 m)  Wt 163 lb (73.9 kg)  LMP 03/07/2011  Breastfeeding? No  BMI 27.55 kg/m2 Estimated body mass index is 27.55 kg/(m^2) as calculated from the following:    Height as of this encounter: 5' 4.5\" (1.638 m).    Weight as of this encounter: 163 lb (73.9 kg).  Medication Reconciliation: complete  "

## 2017-07-07 LAB — PAIN DRUG SCR UR W RPTD MEDS: NORMAL

## 2017-07-08 ENCOUNTER — TELEPHONE (OUTPATIENT)
Dept: FAMILY MEDICINE | Facility: OTHER | Age: 52
End: 2017-07-08

## 2017-07-08 DIAGNOSIS — F90.2 ATTENTION DEFICIT HYPERACTIVITY DISORDER (ADHD), COMBINED TYPE: ICD-10-CM

## 2017-07-08 NOTE — TELEPHONE ENCOUNTER
Results from drug screen were as expected.  Needs 2 more months prescriptions when I return to the clinic.  More Lao MD

## 2017-07-08 NOTE — PROGRESS NOTES
Chelo, your results were as expected.  Please let me know if you have any questions.    More Lao MD

## 2017-07-10 RX ORDER — DEXTROAMPHETAMINE SACCHARATE, AMPHETAMINE ASPARTATE MONOHYDRATE, DEXTROAMPHETAMINE SULFATE AND AMPHETAMINE SULFATE 5; 5; 5; 5 MG/1; MG/1; MG/1; MG/1
40 CAPSULE, EXTENDED RELEASE ORAL DAILY
Qty: 60 CAPSULE | Refills: 0 | Status: SHIPPED | OUTPATIENT
Start: 2017-07-28 | End: 2017-07-10

## 2017-07-10 RX ORDER — DEXTROAMPHETAMINE SACCHARATE, AMPHETAMINE ASPARTATE MONOHYDRATE, DEXTROAMPHETAMINE SULFATE AND AMPHETAMINE SULFATE 5; 5; 5; 5 MG/1; MG/1; MG/1; MG/1
40 CAPSULE, EXTENDED RELEASE ORAL DAILY
Qty: 60 CAPSULE | Refills: 0 | Status: SHIPPED | OUTPATIENT
Start: 2017-08-25 | End: 2017-11-30

## 2017-07-31 ENCOUNTER — TELEPHONE (OUTPATIENT)
Dept: FAMILY MEDICINE | Facility: OTHER | Age: 52
End: 2017-07-31

## 2017-07-31 NOTE — TELEPHONE ENCOUNTER
PA needed for:Adderall XR 20mg capsules - PA required - Patient was on straight medical assistance but now on a prepaid health plan (Blue Plus) and the new one limits to 1 capsule per day.  Please try to get a PA for the dosing.    Insurance: [employer] [facility]  Insur phone: [phone]  Patient ID: [id number]     -Sunita Zamudio, Pharm.D., [facility], [phone]

## 2017-08-15 ENCOUNTER — TELEPHONE (OUTPATIENT)
Dept: FAMILY MEDICINE | Facility: OTHER | Age: 52
End: 2017-08-15

## 2017-08-15 NOTE — TELEPHONE ENCOUNTER
Summary:    Patient is due/failing the following:   FIT test     Action needed:   Complete a FIT test     Type of outreach:    phone no answer or voicemail    Reminder letter sent    Questions for provider review:    None                                                                                                                                    Tawana Madden       Chart routed to Care Team .          Panel Management Review      Patient has the following on her problem list: None      Composite cancer screening  Chart review shows that this patient is due/due soon for the following Fecal Colorectal (FIT)

## 2017-08-15 NOTE — LETTER
[facility]  [address]   [place] [address]  Phone: [phone]  August 15, 2017      Chelo Lopez  [address] [place] [address]      Dear Chelo,    We care about your health and have reviewed your health plan including your medical conditions, medications, and lab results.  Based on this review, it is recommended that you follow up regarding the following health topic(s):  -Colon Cancer Screening    We recommend you take the following action(s):  -schedule a COLONOSCOPY to look for colon cancer (due every 10 years or 5 years in higher risk situations.)  Colonoscopies can prevent 90-95% of colon cancer deaths.  Problem lesions can be removed before they ever become cancer.  If you do not wish to do a colonoscopy or cannot afford to do one at this time, there is another option called a Fecal Immunochemical Occult Blood Test (FIT) a take home stool sample kit.  It does not replace the colonoscopy for colorectal cancer screening, but it can detect hidden bleeding in the lower colon.  It does need to be repeated every year and if a positive result is obtained, you would be referred for a colonoscopy.  If you have completed either one of these tests at another facility, please have the records sent to our clinic for our records.     Please call us at the [facility] - [phone] (or use RADLIVE) to address the above recommendations.     Thank you for trusting [facility] and we appreciate the opportunity to serve you.  We look forward to supporting your healthcare needs in the future.    Healthy Regards,    Your Health Care Team  [facility]

## 2017-11-15 ENCOUNTER — TELEPHONE (OUTPATIENT)
Dept: FAMILY MEDICINE | Facility: OTHER | Age: 52
End: 2017-11-15

## 2017-11-15 NOTE — TELEPHONE ENCOUNTER
Summary:    Patient is due/failing the following:   FOLLOW UP and FIT    Action needed:   Patient needs office visit for follow up . and complete a FIT test     Type of outreach:    phone no answer or voicemail. Reminder letter sent    Questions for provider review:    None                                                                                                                                    Tawana Madden       Chart routed to Care Team .      Panel Management Review      Patient has the following on her problem list: None      Composite cancer screening  Chart review shows that this patient is due/due soon for the following Fecal Colorectal (FIT)

## 2017-11-15 NOTE — LETTER
[facility]  [address]   [facility] [address]  Phone: [phone]  November 15, 2017      Chelo Lopez  [address] [place] [address]      Dear Chelo,    We care about your health and have reviewed your health plan including your medical conditions, medications, and lab results.  Based on this review, it is recommended that you follow up regarding the following health topic(s):  -Colon Cancer Screening  -Follow up    We recommend you take the following action(s):  -schedule a FOLLOWUP APPOINTMENT.  -schedule a COLONOSCOPY to look for colon cancer (due every 10 years or 5 years in higher risk situations.)  Colonoscopies can prevent 90-95% of colon cancer deaths.  Problem lesions can be removed before they ever become cancer.  If you do not wish to do a colonoscopy or cannot afford to do one at this time, there is another option called a Fecal Immunochemical Occult Blood Test (FIT) a take home stool sample kit.  It does not replace the colonoscopy for colorectal cancer screening, but it can detect hidden bleeding in the lower colon.  It does need to be repeated every year and if a positive result is obtained, you would be referred for a colonoscopy.  If you have completed either one of these tests at another facility, please have the records sent to our clinic for our records.     Please call us at the [facility] [place] - [phone] (or use [email or web address]) to address the above recommendations.     Thank you for trusting [facility] and we appreciate the opportunity to serve you.  We look forward to supporting your healthcare needs in the future.    Healthy Regards,    Your Health Care Team  [facility]

## 2017-11-27 ENCOUNTER — MYC MEDICAL ADVICE (OUTPATIENT)
Dept: FAMILY MEDICINE | Facility: OTHER | Age: 52
End: 2017-11-27

## 2017-11-30 ENCOUNTER — TELEPHONE (OUTPATIENT)
Dept: FAMILY MEDICINE | Facility: OTHER | Age: 52
End: 2017-11-30

## 2017-11-30 DIAGNOSIS — F90.2 ATTENTION DEFICIT HYPERACTIVITY DISORDER (ADHD), COMBINED TYPE: ICD-10-CM

## 2017-11-30 RX ORDER — DEXTROAMPHETAMINE SACCHARATE, AMPHETAMINE ASPARTATE MONOHYDRATE, DEXTROAMPHETAMINE SULFATE AND AMPHETAMINE SULFATE 5; 5; 5; 5 MG/1; MG/1; MG/1; MG/1
40 CAPSULE, EXTENDED RELEASE ORAL DAILY
Qty: 40 CAPSULE | Refills: 0 | Status: SHIPPED | OUTPATIENT
Start: 2017-11-30 | End: 2018-03-12

## 2017-11-30 NOTE — TELEPHONE ENCOUNTER
adderall xr      Last Written Prescription Date: 8/25/17  Last Fill Quantity: 60,  # refills: 0   Last Office Visit with [facility], [facility] or [facility] prescribing provider: 6/28/17                                         Next 5 appointments (look out 90 days)     Dec 18, 2017  3:00 PM CST   Office Visit with More Lao MD   [facility]    [address] [id number] [phone]

## 2017-11-30 NOTE — TELEPHONE ENCOUNTER
Reason for call:  Medication  Reason for Call:  Medication or medication refill:    Do you use a [facility]?  Name of the pharmacy and phone number for the current request:  patient    Name of the medication requested: amphetamine-dextroamphetamine (ADDERALL XR) 20 MG per 24 hr capsule    Other request: pt is wondering since she doesn't see  until 12/18/17 she would like a refill on the medication. She would like a call back.    Can we leave a detailed message on this number? YES    Phone number patient can be reached at: Cell number on file:    Telephone Information:   Mobile [phone]       Best Time: anytime    Call taken on 11/30/2017 at 2:18 PM by Samantha Hernandez

## 2017-12-11 NOTE — PROGRESS NOTES
"  SUBJECTIVE:                                                    Chelo Lopez is a 52 year old female who presents to clinic today for the following health issues:    HPI  SUBJECTIVE:  Chelo is here today to recheck ADHD/ADD.    Updates since last visit: No concerns/changes    Routine for taking medicine, including time: Takes in morning  Time medicine wears off: unsure  Issues at home: unsure  Control of symptoms: unsure    Side effects:  Headaches: No  Stomach aches: No  Irritability/mood swings: No  Difficulties with sleep: Yes if she takes it too late in the day  Social withdrawal: No  Unusual movements/tics: No  Decreased appetite: No    Other concerns: No    She isn't sure if it is working for her, can't seem to tell a difference. Her initial symptoms included forgetting to pay her bills and not being motivated to complete projects. She states that now she automates her bills and doesn't have trouble with paying them. She often forgets to take her medication and doesn't seem to notice a difference between when she takes it and when she doesn't. Her original diagnoses was of predominantly inattentive type, psychologist had noted at the time that if she didn't improve with medication then she might benefit instead from cognitive behavioral therapy.     She states that when she was taking the short release Adderall she noticed the affects of it more, than now when she is taking the Adderall XR. Difficult to tell because of her sporadic use of the medications. She also states that she doesn't like to take the Adderall when she is trying to be creative, feels that it messes up her creative flow, there are times when she \"doesn't want to be focused\" so she doesn't take it.     Smoking: She reports being ready to stop smoking and would like to try Chantix again. She also states that she had tried it originally, but it made her vivid nightmares worse.        Patient Active Problem List   Diagnosis     Other " disorder of menstruation and other abnormal bleeding from female genital tract     Tobacco use disorder     PMDD (premenstrual dysphoric disorder)     Menorrhagia     CARDIOVASCULAR SCREENING; LDL GOAL LESS THAN 160     ADHD (attention deficit hyperactivity disorder)     Osteoarthritis of hip       Past Medical History:   Diagnosis Date     Other disorder of menstruation and other abnormal bleeding from female genital tract      Tobacco use disorder        Past Surgical History:   Procedure Laterality Date     ARTHROPLASTY HIP  5/2/2012    Procedure:ARTHROPLASTY HIP; Right Total Hip Arthroplasty; Surgeon:DAGOBERTO CAMPBELL; [facility]     ARTHROPLASTY HIP Left 1/12/2015    Procedure: ARTHROPLASTY HIP;  Surgeon: Dagoberto Campbell MD;  [facility]     C NONSPECIFIC PROCEDURE  2001    cervical cone procedure?     C NONSPECIFIC PROCEDURE      rt salpingectomy (uncertain if oophorectomy)     COSMETIC MAMMOPLASTY AUGMENTATION BILATERAL      implants     CYSTOSCOPY  3/14/2011    CYSTOSCOPY performed by RICHARD MACKEY at  OR     HYSTERECTOMY  2011    menorrhagia     HYSTERECTOMY, PAP NO LONGER INDICATED       LAPAROSCOPIC ASSISTED HYSTERECTOMY VAGINAL  3/14/2011    LAPAROSCOPIC ASSISTED HYSTERECTOMY VAGINAL performed by RICHARD MACKEY at  OR     LAPAROSCOPY DIAGNOSTIC (GYN)  3/14/2011    LAPAROSCOPY DIAGNOSTIC (GYN) performed by RICHARD MACKEY at  OR     SURGICAL HISTORY OF -   1985    Laparotomy for the fallopian tube abscess        Current Outpatient Prescriptions   Medication     buPROPion (WELLBUTRIN XL) 150 MG 24 hr tablet     [START ON 3/2/2018] amphetamine-dextroamphetamine (ADDERALL) 20 MG per tablet     amphetamine-dextroamphetamine (ADDERALL XR) 20 MG per 24 hr capsule     [DISCONTINUED] amphetamine-dextroamphetamine (ADDERALL) 20 MG per tablet     [DISCONTINUED] amphetamine-dextroamphetamine (ADDERALL) 20 MG per tablet     [DISCONTINUED] amphetamine-dextroamphetamine (ADDERALL) 20 MG  tablet     No current facility-administered medications for this visit.        ROS  Constitutional, HEENT, cardiovascular, pulmonary, GI, , musculoskeletal, neuro, skin, endocrine and psych systems are negative, except as in HPI or otherwise noted.     This document serves as a record of the services and decisions personally performed and made by More Lao MD. It was created on her behalf by Apurva William, a trained medical scribe. The creation of this document is based the provider's statements to the medical scribe.  Apurva William, January 3, 2018 12:52 PM       OBJECTIVE:  BP 94/60  Pulse 97  Temp 97.4  F (36.3  C) (Temporal)  Wt 177 lb (80.3 kg)  LMP 03/07/2011  SpO2 98%  Breastfeeding? No  BMI 29.91 kg/m2  Gen: alert, awake, NAD  Lungs: CTA russ  Heart: RR without murmur  Psych: easily off topic and distractable.  Mentions her creative flare and not needing help to focus -- asked about manic symptoms and doesn't think she quite meets criteria, but is suspiciously close based on some descriptions of her behavior.      ASSESSMENT:    ICD-10-CM    1. Attention deficit hyperactivity disorder (ADHD), combined type F90.2 MENTAL HEALTH REFERRAL  - Adult; Assessments and Testing; Neuropsychological Testing; Other[facility] [phone]; We will contact you to schedule the appointment or please call with any questions     amphetamine-dextroamphetamine (ADDERALL) 20 MG per tablet     DISCONTINUED: amphetamine-dextroamphetamine (ADDERALL) 20 MG per tablet     DISCONTINUED: amphetamine-dextroamphetamine (ADDERALL) 20 MG per tablet   2. Screen for colon cancer Z12.11    3. Need for prophylactic vaccination and inoculation against influenza Z23    4. Tobacco abuse disorder Z72.0 buPROPion (WELLBUTRIN XL) 150 MG 24 hr tablet     ADHD: Discussed potential for pursuing behavioral therapy. Hard to determine efficacy of Adderall dosage given sporadic follow-up schedule and her routine for taking meds.  There is a potential that ADHD is not the diagnosis for the symptoms she is experiencing, may want to go back in to neuropsych for further evaluation, or go for several sessions of cognitive behavioral therapy to work through ability to take her meds. She would rather go in to see the psychologist again to be re-evaluated than have to come in weekly for therapy. Will take meds every day with timer long enough to get to testing. Will refer back to neuropsych and plan to be on the medications regularly. Scottie will want her to be on the medications regularly for at least a week prior to evaluation.  She thinks she can do this as she isn't taking the meds regularly as she doesn't think they are working for her.    Smoking Cessation: Advised pt on benefits of smoking cessation and discussed potential use of counseling and pharmacotherapies, including referral to QuitPlan, Chantix and Wellbutrin.     Hip Replacement: Recommended she follow up with Dr. Waddell about the pain she is having in the hip she had replaced 2 years ago. She should be able to call his office directly and schedule.  This was not on the schedule for evaluation today, and did discuss possible other visit here, but she asked about seeing Dr. Waddell again which is a perfectly reasonable option as well.    Screenings: Patient is due for annual colon cancer screening and biannual mammogram. Will help schedule the mammogram.     PLAN:  Patient Instructions   Please plan evaluation with neuropsych as we work to find the reason that the meds are not helping.   Plan f/u after the neuropsych testing -- 3 months of meds given.      The information in this document, created by the medical scribe for me, accurately reflects the services I personally performed and the decisions made by me. I have reviewed and approved this document for accuracy.   MD More Escalante MD

## 2018-01-03 ENCOUNTER — OFFICE VISIT (OUTPATIENT)
Dept: FAMILY MEDICINE | Facility: OTHER | Age: 53
End: 2018-01-03
Payer: COMMERCIAL

## 2018-01-03 VITALS
DIASTOLIC BLOOD PRESSURE: 60 MMHG | SYSTOLIC BLOOD PRESSURE: 94 MMHG | TEMPERATURE: 97.4 F | OXYGEN SATURATION: 98 % | BODY MASS INDEX: 29.91 KG/M2 | HEART RATE: 97 BPM | WEIGHT: 177 LBS

## 2018-01-03 DIAGNOSIS — Z23 NEED FOR PROPHYLACTIC VACCINATION AND INOCULATION AGAINST INFLUENZA: ICD-10-CM

## 2018-01-03 DIAGNOSIS — F90.2 ATTENTION DEFICIT HYPERACTIVITY DISORDER (ADHD), COMBINED TYPE: Primary | ICD-10-CM

## 2018-01-03 DIAGNOSIS — Z12.11 SCREEN FOR COLON CANCER: ICD-10-CM

## 2018-01-03 DIAGNOSIS — Z72.0 TOBACCO ABUSE DISORDER: ICD-10-CM

## 2018-01-03 PROCEDURE — 99214 OFFICE O/P EST MOD 30 MIN: CPT | Performed by: FAMILY MEDICINE

## 2018-01-03 RX ORDER — DEXTROAMPHETAMINE SACCHARATE, AMPHETAMINE ASPARTATE, DEXTROAMPHETAMINE SULFATE AND AMPHETAMINE SULFATE 5; 5; 5; 5 MG/1; MG/1; MG/1; MG/1
20 TABLET ORAL DAILY
Qty: 30 TABLET | Refills: 0 | Status: SHIPPED | OUTPATIENT
Start: 2018-01-03 | End: 2018-01-03

## 2018-01-03 RX ORDER — DEXTROAMPHETAMINE SACCHARATE, AMPHETAMINE ASPARTATE, DEXTROAMPHETAMINE SULFATE AND AMPHETAMINE SULFATE 5; 5; 5; 5 MG/1; MG/1; MG/1; MG/1
20 TABLET ORAL DAILY
Qty: 30 TABLET | Refills: 0 | Status: SHIPPED | OUTPATIENT
Start: 2018-03-02 | End: 2018-03-12

## 2018-01-03 RX ORDER — DEXTROAMPHETAMINE SACCHARATE, AMPHETAMINE ASPARTATE, DEXTROAMPHETAMINE SULFATE AND AMPHETAMINE SULFATE 5; 5; 5; 5 MG/1; MG/1; MG/1; MG/1
20 TABLET ORAL DAILY
Qty: 30 TABLET | Refills: 0 | Status: SHIPPED | OUTPATIENT
Start: 2018-02-02 | End: 2018-01-03

## 2018-01-03 RX ORDER — BUPROPION HYDROCHLORIDE 150 MG/1
150 TABLET ORAL EVERY MORNING
Qty: 30 TABLET | Refills: 1 | Status: SHIPPED | OUTPATIENT
Start: 2018-01-03 | End: 2018-03-12

## 2018-01-03 NOTE — MR AVS SNAPSHOT
After Visit Summary   1/3/2018    Chelo Lopez    MRN: [id number]           Patient Information     Date Of Birth          1965        Visit Information        Provider Department      1/3/2018 12:30 PM More Lao MD [facility]        Today's Diagnoses     Attention deficit hyperactivity disorder (ADHD), combined type    -  1    Screen for colon cancer        Need for prophylactic vaccination and inoculation against influenza        Tobacco abuse disorder          Care Instructions    Please plan evaluation with neuropsych as we work to find the reason that the meds are not helping.   Plan f/u after the neuropsych testing -- 3 months of meds given.          Follow-ups after your visit        Additional Services     MENTAL HEALTH REFERRAL  - Adult; Assessments and Testing; Neuropsychological Testing; Other: [facility] [phone]; We will contact you to schedule the appointment or please call with any questions       All scheduling is subject to the client's specific insurance plan & benefits, provider/location availability, and provider clinical specialities.  Please arrive 15 minutes early for your first appointment and bring your completed paperwork.    Please be aware that coverage of these services is subject to the terms and limitations of your health insurance plan.  Call member services at your health plan with any benefit or coverage questions.                            Who to contact     If you have questions or need follow up information about today's clinic visit or your schedule please contact [facility] directly at [phone].  Normal or non-critical lab and imaging results will be communicated to you by MyChart, letter or phone within 4 business days after the clinic has received the results. If you do not hear from us within 7 days, please contact the clinic through MyChart or phone. If you have a critical or  abnormal lab result, we will notify you by phone as soon as possible.  Submit refill requests through [employer] or call your pharmacy and they will forward the refill request to us. Please allow 3 business days for your refill to be completed.          Additional Information About Your Visit        [employer] Information     [employer] gives you secure access to your electronic health record. If you see a primary care provider, you can also send messages to your care team and make appointments. If you have questions, please call your primary care clinic.  If you do not have a primary care provider, please call [phone] and they will assist you.        Care EveryWhere ID     This is your Care EveryWhere ID. This could be used by other organizations to access your [place] medical records  [id number]        Your Vitals Were     Pulse Temperature Last Period Pulse Oximetry Breastfeeding? BMI (Body Mass Index)    97 97.4  F (36.3  C) (Temporal) 03/07/2011 98% No 29.91 kg/m2       Blood Pressure from Last 3 Encounters:   01/03/18 94/60   06/28/17 96/64   05/31/17 132/86    Weight from Last 3 Encounters:   01/03/18 177 lb (80.3 kg)   06/28/17 163 lb (73.9 kg)   05/31/17 168 lb (76.2 kg)              We Performed the Following     MENTAL HEALTH REFERRAL  - Adult; Assessments and Testing; Neuropsychological Testing; Other: [facility] [phone]; We will contact you to schedule the appointment or please call with any questions          Today's Medication Changes          These changes are accurate as of: 1/3/18  1:27 PM.  If you have any questions, ask your nurse or doctor.               Start taking these medicines.        Dose/Directions    buPROPion 150 MG 24 hr tablet   Commonly known as:  WELLBUTRIN XL   Used for:  Tobacco abuse disorder   Started by:  More Lao MD        Dose:  150 mg   Take 1 tablet (150 mg) by mouth every morning   Quantity:  30 tablet   Refills:  1         These  medicines have changed or have updated prescriptions.        Dose/Directions    * amphetamine-dextroamphetamine 20 MG per 24 hr capsule   Commonly known as:  ADDERALL XR   This may have changed:  Another medication with the same name was added. Make sure you understand how and when to take each.   Used for:  Attention deficit hyperactivity disorder (ADHD), combined type   Changed by:  More Lao MD        Dose:  40 mg   Take 2 capsules (40 mg) by mouth daily   Quantity:  40 capsule   Refills:  0       * amphetamine-dextroamphetamine 20 MG per tablet   Commonly known as:  ADDERALL   This may have changed:  You were already taking a medication with the same name, and this prescription was added. Make sure you understand how and when to take each.   Used for:  Attention deficit hyperactivity disorder (ADHD), combined type   Changed by:  More Lao MD        Dose:  20 mg   Start taking on:  3/2/2018   Take 1 tablet (20 mg) by mouth daily   Quantity:  30 tablet   Refills:  0       * Notice:  This list has 2 medication(s) that are the same as other medications prescribed for you. Read the directions carefully, and ask your doctor or other care provider to review them with you.         Where to get your medicines      These medications were sent to [facility] [address] [address]     Phone:  [phone]     buPROPion 150 MG 24 hr tablet         Some of these will need a paper prescription and others can be bought over the counter.  Ask your nurse if you have questions.     Bring a paper prescription for each of these medications     amphetamine-dextroamphetamine 20 MG per tablet                Primary Care Provider Office Phone # Fax #    More Lao [phone]       [address] [address]        Equal Access to Services     LINDA HOWE AH: joseph Brunson qaybta kaalmada  hannah quinteroelizabeth pauloirineo perdomoaan ah. [facility] [phone].    ATENCIÓN: Si alekseyla aydin, tiene a andujar disposición servicios gratuitos de asistencia lingüística. Antonio al [phone].    We comply with applicable federal civil rights laws and [place] laws. We do not discriminate on the basis of race, color, national origin, age, disability, sex, sexual orientation, or gender identity.            Thank you!     Thank you for choosing [facility]  for your care. Our goal is always to provide you with excellent care. Hearing back from our patients is one way we can continue to improve our services. Please take a few minutes to complete the written survey that you may receive in the mail after your visit with us. Thank you!             Your Updated Medication List - Protect others around you: Learn how to safely use, store and throw away your medicines at www.disposemymeds.org.          This list is accurate as of: 1/3/18  1:27 PM.  Always use your most recent med list.                   Brand Name Dispense Instructions for use Diagnosis    * amphetamine-dextroamphetamine 20 MG per 24 hr capsule    ADDERALL XR    40 capsule    Take 2 capsules (40 mg) by mouth daily    Attention deficit hyperactivity disorder (ADHD), combined type       * amphetamine-dextroamphetamine 20 MG per tablet   Start taking on:  3/2/2018    ADDERALL    30 tablet    Take 1 tablet (20 mg) by mouth daily    Attention deficit hyperactivity disorder (ADHD), combined type       buPROPion 150 MG 24 hr tablet    WELLBUTRIN XL    30 tablet    Take 1 tablet (150 mg) by mouth every morning    Tobacco abuse disorder       * Notice:  This list has 2 medication(s) that are the same as other medications prescribed for you. Read the directions carefully, and ask your doctor or other care provider to review them with you.

## 2018-01-03 NOTE — NURSING NOTE
"Chief Complaint   Patient presents with     A.D.H.D     Panel Management     Flu, FIT       Initial BP 94/60  Pulse 97  Temp 97.4  F (36.3  C) (Temporal)  Wt 177 lb (80.3 kg)  LMP 03/07/2011  SpO2 98%  Breastfeeding? No  BMI 29.91 kg/m2 Estimated body mass index is 29.91 kg/(m^2) as calculated from the following:    Height as of 6/28/17: 5' 4.5\" (1.638 m).    Weight as of this encounter: 177 lb (80.3 kg).  Medication Reconciliation: complete  "

## 2018-01-03 NOTE — PATIENT INSTRUCTIONS
Please plan evaluation with neuropsych as we work to find the reason that the meds are not helping.   Plan f/u after the neuropsych testing -- 3 months of meds given.

## 2018-02-23 ENCOUNTER — TELEPHONE (OUTPATIENT)
Dept: FAMILY MEDICINE | Facility: OTHER | Age: 53
End: 2018-02-23

## 2018-02-23 NOTE — TELEPHONE ENCOUNTER
Summary:    Patient is due/failing the following:   FIT and MAMMOGRAM    Action needed:   Complete a FIT test and schedule a mammogram     Type of outreach:    Phone, left message for patient to call back.  and Sent letter.    Questions for provider review:    None                                                                                                                                    Tawana Madden     Chart routed to Care Team .      Panel Management Review      Patient has the following on her problem list: None      Composite cancer screening  Chart review shows that this patient is due/due soon for the following Mammogram and Fecal Colorectal (FIT)

## 2018-02-23 NOTE — LETTER
[facility]  [address]   [facility] [address]  Phone: [phone]  February 23, 2018      Chelo Lopez  [address] [place] [address]      Dear Chelo,    We care about your health and have reviewed your health plan including your medical conditions, medications, and lab results.  Based on this review, it is recommended that you follow up regarding the following health topic(s):  -Breast Cancer Screening  -Colon Cancer Screening    We recommend you take the following action(s):  -schedule a MAMMOGRAM which is due. Please disregard this reminder if you have had this exam elsewhere within the last 1-2 years please let us know so we can update your records.  -schedule a COLONOSCOPY to look for colon cancer (due every 10 years or 5 years in higher risk situations.)  Colonoscopies can prevent 90-95% of colon cancer deaths.  Problem lesions can be removed before they ever become cancer.  If you do not wish to do a colonoscopy or cannot afford to do one at this time, there is another option called a Fecal Immunochemical Occult Blood Test (FIT) a take home stool sample kit.  It does not replace the colonoscopy for colorectal cancer screening, but it can detect hidden bleeding in the lower colon.  It does need to be repeated every year and if a positive result is obtained, you would be referred for a colonoscopy.  If you have completed either one of these tests at another facility, please have the records sent to our clinic for our records.     Please call us at the [facility] - [phone] (or use [employer]) to address the above recommendations.     Thank you for trusting [facility] [place] and we appreciate the opportunity to serve you.  We look forward to supporting your healthcare needs in the future.    Healthy Regards,    Your Health Care Team  [facility]

## 2018-03-06 NOTE — PROGRESS NOTES
[address]  [facility] [address]  [phone]  Dept: [phone]    PRE-OP EVALUATION:  Today's date: 3/12/2018    Chelo Lopez (: 1965) presents for pre-operative evaluation assessment as requested by Dr. Flores.  She requires evaluation and anesthesia risk assessment prior to undergoing surgery/procedure for  Cosmetic liposuction and abdominoplasty.    Proposed Surgery/ Procedure: Cosmetic Liposuction and abdominoplasty  Date of Surgery/ Procedure: 3/13/2018  Time of Surgery/ Procedure: 6:30am  Hospital/Surgical Facility: [facility]  Fax number for surgical facility: [phone]  Primary Physician: More Lao  Type of Anesthesia Anticipated: General    Patient has a Health Care Directive or Living Will:  NO    1. NO - Do you have a history of heart attack, stroke, stent, bypass or surgery on an artery in the head, neck, heart or legs?  2. NO - Do you ever have any pain or discomfort in your chest?  3. NO - Do you have a history of  Heart Failure?  4. NO - Are you troubled by shortness of breath when: walking on the level, up a slight hill or at night?  5. NO - Do you currently have a cold, bronchitis or other respiratory infection?  6. NO - Do you have a cough, shortness of breath or wheezing?  7. NO - Do you sometimes get pains in the calves of your legs when you walk?  8. NO - Do you or anyone in your family have previous history of blood clots?  9. NO - Do you or does anyone in your family have a serious bleeding problem such as prolonged bleeding following surgeries or cuts?  10. NO - Have you ever had problems with anemia or been told to take iron pills?  11. NO - Have you had any abnormal blood loss such as black, tarry or bloody stools, or abnormal vaginal bleeding?  12. YES - HAVE YOU EVER HAD A BLOOD TRANSFUSION? With hysteroectomy  13. NO - Have you or any of your relatives ever had problems with anesthesia?  14. NO - Do you have  "sleep apnea, excessive snoring or daytime drowsiness?  15. NO - Do you have any prosthetic heart valves?  16. YES - DO YOU HAVE PROSTHETIC JOINTS? Total hip replacement, bilateral  17. NO - Is there any chance that you may be pregnant?      HPI:     HPI related to upcoming procedure: Patient has been saving up for some time to have this cosmetic procedure done. She is planning to have liposuction on her trunk, and a \"tummy tuck\" with Dr. Flores.       See problem list for active medical problems.  Problems all longstanding and stable, except as noted/documented.  See ROS for pertinent symptoms related to these conditions.                                                                                                  .    MEDICAL HISTORY:     Patient Active Problem List    Diagnosis Date Noted     Osteoarthritis of hip 01/12/2015     Priority: Medium     Do you wish to do the replacement in the background? yes         ADHD (attention deficit hyperactivity disorder) 09/11/2011     Priority: Medium     Diagnosed at [facility].  Psychological assessment on 9/11/11.  See scanned copy under \"Mental Health Referral\"  ADHD agreement on file 9/30/2013        CARDIOVASCULAR SCREENING; LDL GOAL LESS THAN 160 10/31/2010     Priority: Medium     PMDD (premenstrual dysphoric disorder) 09/16/2008     Priority: Medium     Menorrhagia 09/16/2008     Priority: Medium     Tobacco use disorder      Priority: Medium     Other disorder of menstruation and other abnormal bleeding from female genital tract      Priority: Medium      Past Medical History:   Diagnosis Date     Other disorder of menstruation and other abnormal bleeding from female genital tract      Tobacco use disorder      Past Surgical History:   Procedure Laterality Date     ARTHROPLASTY HIP  5/2/2012    Procedure:ARTHROPLASTY HIP; Right Total Hip Arthroplasty; Surgeon:TROY CAMPBELL; [facility]     ARTHROPLASTY HIP Left 1/12/2015    Procedure: " ARTHROPLASTY HIP;  Surgeon: Dagoberto Waddell MD;  [facility]     C NONSPECIFIC PROCEDURE  2001    cervical cone procedure?     C NONSPECIFIC PROCEDURE      rt salpingectomy (uncertain if oophorectomy)     COSMETIC MAMMOPLASTY AUGMENTATION BILATERAL      implants     CYSTOSCOPY  3/14/2011    CYSTOSCOPY performed by RICHARD MACKEY at  OR     HYSTERECTOMY  2011    menorrhagia     HYSTERECTOMY, PAP NO LONGER INDICATED       LAPAROSCOPIC ASSISTED HYSTERECTOMY VAGINAL  3/14/2011    LAPAROSCOPIC ASSISTED HYSTERECTOMY VAGINAL performed by RICHARD MACKEY at  OR     LAPAROSCOPY DIAGNOSTIC (GYN)  3/14/2011    LAPAROSCOPY DIAGNOSTIC (GYN) performed by RICHARD MACKEY at  OR     SURGICAL HISTORY OF -   1985    Laparotomy for the fallopian tube abscess      Current Outpatient Prescriptions   Medication Sig Dispense Refill     buPROPion (WELLBUTRIN XL) 150 MG 24 hr tablet Take 1 tablet (150 mg) by mouth every morning 30 tablet 1     amphetamine-dextroamphetamine (ADDERALL) 20 MG per tablet Take 1 tablet (20 mg) by mouth daily 30 tablet 0     amphetamine-dextroamphetamine (ADDERALL XR) 20 MG per 24 hr capsule Take 2 capsules (40 mg) by mouth daily 40 capsule 0     OTC products: None, except as noted above    Allergies   Allergen Reactions     Bees      No Known Allergies       Latex Allergy: NO    Social History   Substance Use Topics     Smoking status: Current Every Day Smoker     Packs/day: 0.50     Types: Cigarettes     Smokeless tobacco: Never Used      Comment: 1/2 pack weekly-socially only     Alcohol use Yes      Comment: 4 weekly     History   Drug Use No       REVIEW OF SYSTEMS:     Constitutional, HEENT, cardiovascular, pulmonary, GI, , musculoskeletal, neuro, skin, endocrine and psych systems are negative, except as in HPI or otherwise noted.     This document serves as a record of the services and decisions personally performed and made by More Lao MD. It was created on her behalf by  "Apurva William, a trained medical scribe. The creation of this document is based the provider's statements to the medical scribe.  Apurva William, March 12, 2018 12:58 PM     EXAM:   /70 (BP Location: Right arm, Patient Position: Chair, Cuff Size: Adult Regular)  Pulse 74  Temp 97.5  F (36.4  C) (Temporal)  Resp 12  Ht 1.638 m (5' 4.49\")  Wt 83.5 kg (184 lb)  LMP 03/07/2011  BMI 31.11 kg/m2    GENERAL APPEARANCE: healthy, alert and no distress     EYES: EOMI, PERRL     HENT: ear canals and TM's normal and nose and mouth without ulcers or lesions     NECK: no adenopathy, no asymmetry, masses, or scars and thyroid normal to palpation     RESP: lungs clear to auscultation - no rales, rhonchi or wheezes     CV: regular rates and rhythm, normal S1 S2, no S3 or S4 and no murmur, click or rub     ABDOMEN:  soft, nontender, no HSM or masses and bowel sounds normal     MS: extremities normal- no gross deformities noted, no evidence of inflammation in joints, FROM in all extremities.     SKIN: no suspicious lesions or rashes to visible skin     NEURO: Normal strength and tone, sensory exam grossly normal, mentation intact and speech normal     PSYCH: mentation appears normal. and affect normal/bright     LYMPHATICS: No cervical adenopathy    DIAGNOSTICS:     EKG: appears normal, NSR, normal axis, normal intervals, no acute ST/T changes c/w ischemia, no LVH by voltage criteria, unchanged from previous tracings    Labs Resulted Today:   Results for orders placed or performed in visit on 06/28/17   Pain Drug Scr UR W Rptd Meds   Result Value Ref Range    Pain Drug SCR UR W RPTD Meds       FINAL  (Note)  ====================================================================  [facility] COMP DRUG ANALYSIS,UR  ====================================================================  Test                             Result       Flag       Units  Drug Present and Declared for Prescription Verification   Amphetamine                    " 07507        EXPECTED   ng/mg creat    Amphetamine is available as a schedule II prescription drug.    ====================================================================  Test                      Result    Flag   Units      Ref Range   Creatinine              30               mg/dL      >=20  ====================================================================  Declared Medications:  The flagging and interpretation on this report are based on the  following declared medications.  Unexpected results may arise from  inaccuracies in the declared medications.    **Note: The testing scope of this panel includes these medications:    Amphetamine (Adderal l)  ====================================================================  For clinical consultation, please call [phone].  ====================================================================  Analysis performed by [employer] [place] [address]       Labs Drawn and in Process:   Unresulted Labs Ordered in the Past 30 Days of this Admission     No orders found from 1/11/2018 to 3/13/2018.          Recent Labs   Lab Test  01/15/15   0634  01/14/15   0653  05/24/12 05/17/12   HGB  10.3*  10.2*   < >   --    --    PLT  182  178   < >   --    --    INR   --    --    --   1.5  1.5   NA  142  142   < >   --    --    POTASSIUM  3.7  3.5   < >   --    --    CR  0.74  0.74   < >   --    --     < > = values in this interval not displayed.     IMPRESSION:   Reason for surgery/procedure: Cosmetic/ Liposuction and abdominoplasty  Diagnosis/reason for consult: Healthy 53 year old female with stable, longstanding conditions/ Pre-op evaluation prior to cosmetic abdominal surgery.     The proposed surgical procedure is considered INTERMEDIATE risk.    REVISED CARDIAC RISK INDEX  The patient has the following serious cardiovascular risks for perioperative complications such as (MI, PE, VFib and 3  AV Block):  No serious cardiac risks  INTERPRETATION: 0 risks: Class  I (very low risk - 0.4% complication rate)    The patient has the following additional risks for perioperative complications:  No identified additional risks      ICD-10-CM    1. Preop general physical exam Z01.818 EKG 12-lead complete w/read - Clinics     HONORING CHOICES REFERRAL   2. Screen for colon cancer Z12.11 Fecal colorectal cancer screen (FIT)   3. Visit for screening mammogram Z12.31 MA SCREENING DIGITAL BILAT - Future  (s+30)   4. Attention deficit hyperactivity disorder (ADHD), combined type F90.2 amphetamine-dextroamphetamine (ADDERALL) 20 MG per tablet   5. Tobacco abuse disorder Z72.0        RECOMMENDATIONS:     --Consult hospital rounder / IM to assist post-op medical management    --Patient is to take all scheduled medications on the day of surgery EXCEPT for modifications listed below.    Hold your Adderall the day of surgery.    APPROVAL GIVEN to proceed with proposed procedure, without further diagnostic evaluation     The information in this document, created by the medical scribe for me, accurately reflects the services I personally performed and the decisions made by me. I have reviewed and approved this document for accuracy.   More Lao MD     Signed Electronically by: More Lao MD, MD    Copy of this evaluation report is provided to requesting physician.    [facility] Preop Guidelines

## 2018-03-06 NOTE — PATIENT INSTRUCTIONS
Before Your Surgery    Call your surgeon if there is any change in your health. This includes signs of a cold or flu (such as a sore throat, runny nose, cough, rash or fever).    Do not smoke, drink alcohol or take over the counter medicine (unless your surgeon or primary care doctor tells you to) for the 24 hours before and after surgery.    If you take prescribed drugs: Follow your doctor s orders about which medicines to take and which to stop until after surgery.    Eating and drinking prior to surgery: follow the instructions from your surgeon    Take a shower or bath the night before surgery. Use the soap your surgeon gave you to gently clean your skin. If you do not have soap from your surgeon, use your regular soap. Do not shave or scrub the surgery site.  Wear clean pajamas and have clean sheets on your bed.

## 2018-03-12 ENCOUNTER — OFFICE VISIT (OUTPATIENT)
Dept: FAMILY MEDICINE | Facility: OTHER | Age: 53
End: 2018-03-12
Payer: COMMERCIAL

## 2018-03-12 VITALS
BODY MASS INDEX: 31.41 KG/M2 | SYSTOLIC BLOOD PRESSURE: 110 MMHG | WEIGHT: 184 LBS | RESPIRATION RATE: 12 BRPM | DIASTOLIC BLOOD PRESSURE: 70 MMHG | HEART RATE: 74 BPM | TEMPERATURE: 97.5 F | HEIGHT: 64 IN

## 2018-03-12 DIAGNOSIS — Z01.818 PREOP GENERAL PHYSICAL EXAM: Primary | ICD-10-CM

## 2018-03-12 DIAGNOSIS — F90.2 ATTENTION DEFICIT HYPERACTIVITY DISORDER (ADHD), COMBINED TYPE: ICD-10-CM

## 2018-03-12 DIAGNOSIS — Z12.31 VISIT FOR SCREENING MAMMOGRAM: ICD-10-CM

## 2018-03-12 DIAGNOSIS — Z12.11 SCREEN FOR COLON CANCER: ICD-10-CM

## 2018-03-12 DIAGNOSIS — Z72.0 TOBACCO ABUSE DISORDER: ICD-10-CM

## 2018-03-12 PROCEDURE — 93000 ELECTROCARDIOGRAM COMPLETE: CPT | Performed by: FAMILY MEDICINE

## 2018-03-12 PROCEDURE — 99215 OFFICE O/P EST HI 40 MIN: CPT | Performed by: FAMILY MEDICINE

## 2018-03-12 RX ORDER — BUPROPION HYDROCHLORIDE 150 MG/1
150 TABLET ORAL EVERY MORNING
Qty: 90 TABLET | Refills: 1 | Status: SHIPPED | OUTPATIENT
Start: 2018-03-12 | End: 2019-02-04

## 2018-03-12 RX ORDER — DEXTROAMPHETAMINE SACCHARATE, AMPHETAMINE ASPARTATE, DEXTROAMPHETAMINE SULFATE AND AMPHETAMINE SULFATE 5; 5; 5; 5 MG/1; MG/1; MG/1; MG/1
20 TABLET ORAL DAILY
Qty: 30 TABLET | Refills: 0 | Status: SHIPPED | OUTPATIENT
Start: 2018-04-12 | End: 2018-03-12

## 2018-03-12 RX ORDER — DEXTROAMPHETAMINE SACCHARATE, AMPHETAMINE ASPARTATE, DEXTROAMPHETAMINE SULFATE AND AMPHETAMINE SULFATE 5; 5; 5; 5 MG/1; MG/1; MG/1; MG/1
20 TABLET ORAL DAILY
Qty: 30 TABLET | Refills: 0 | Status: SHIPPED | OUTPATIENT
Start: 2018-03-12 | End: 2018-03-12

## 2018-03-12 RX ORDER — DEXTROAMPHETAMINE SACCHARATE, AMPHETAMINE ASPARTATE, DEXTROAMPHETAMINE SULFATE AND AMPHETAMINE SULFATE 5; 5; 5; 5 MG/1; MG/1; MG/1; MG/1
20 TABLET ORAL DAILY
Qty: 30 TABLET | Refills: 0 | Status: SHIPPED | OUTPATIENT
Start: 2018-05-11 | End: 2019-02-04

## 2018-03-12 RX ORDER — BUPROPION HYDROCHLORIDE 150 MG/1
150 TABLET ORAL EVERY MORNING
Qty: 30 TABLET | Refills: 1 | Status: SHIPPED | OUTPATIENT
Start: 2018-03-12 | End: 2018-03-12

## 2018-03-12 NOTE — MR AVS SNAPSHOT
After Visit Summary   3/12/2018    Chelo Lopez    MRN: [id number]           Patient Information     Date Of Birth          1965        Visit Information        Provider Department      3/12/2018 12:30 PM More Lao MD [facility]        Today's Diagnoses     Preop general physical exam    -  1    Screen for colon cancer        Visit for screening mammogram        Attention deficit hyperactivity disorder (ADHD), combined type        Tobacco abuse disorder          Care Instructions      Before Your Surgery    Call your surgeon if there is any change in your health. This includes signs of a cold or flu (such as a sore throat, runny nose, cough, rash or fever).    Do not smoke, drink alcohol or take over the counter medicine (unless your surgeon or primary care doctor tells you to) for the 24 hours before and after surgery.    If you take prescribed drugs: Follow your doctor s orders about which medicines to take and which to stop until after surgery.    Eating and drinking prior to surgery: follow the instructions from your surgeon    Take a shower or bath the night before surgery. Use the soap your surgeon gave you to gently clean your skin. If you do not have soap from your surgeon, use your regular soap. Do not shave or scrub the surgery site.  Wear clean pajamas and have clean sheets on your bed.           Follow-ups after your visit        Follow-up notes from your care team     Return in about 3 months (around 6/12/2018) for ADHD Medication Re-check.      Your next 10 appointments already scheduled     Mar 13, 2018   Procedure with Jinny Flores MD   [facility] (--)    [address]  [facility] [address]   [phone]              Future tests that were ordered for you today     Open Future Orders        Priority Expected Expires Ordered    Fecal colorectal cancer screen (FIT) Routine 3/27/2018 5/29/2018 3/12/2018        "     Who to contact     If you have questions or need follow up information about today's clinic visit or your schedule please contact [facility] directly at [phone].  Normal or non-critical lab and imaging results will be communicated to you by MyChart, letter or phone within 4 business days after the clinic has received the results. If you do not hear from us within 7 days, please contact the clinic through [employer] or phone. If you have a critical or abnormal lab result, we will notify you by phone as soon as possible.  Submit refill requests through [employer] or call your pharmacy and they will forward the refill request to us. Please allow 3 business days for your refill to be completed.          Additional Information About Your Visit        [employer] Information     [employer] gives you secure access to your electronic health record. If you see a primary care provider, you can also send messages to your care team and make appointments. If you have questions, please call your primary care clinic.  If you do not have a primary care provider, please call [phone] and they will assist you.        Care EveryWhere ID     This is your Care EveryWhere ID. This could be used by other organizations to access your [facility] medical records  [id number]        Your Vitals Were     Pulse Temperature Respirations Height Last Period BMI (Body Mass Index)    74 97.5  F (36.4  C) (Temporal) 12 5' 4.49\" (1.638 m) 03/07/2011 31.11 kg/m2       Blood Pressure from Last 3 Encounters:   03/12/18 110/70   01/03/18 94/60   06/28/17 96/64    Weight from Last 3 Encounters:   03/12/18 184 lb (83.5 kg)   01/03/18 177 lb (80.3 kg)   06/28/17 163 lb (73.9 kg)              We Performed the Following     EKG 12-lead complete w/read - Clinics          Today's Medication Changes          These changes are accurate as of 3/12/18  1:50 PM.  If you have any questions, ask your nurse or doctor.               Start taking these " medicines.        Dose/Directions    amphetamine-dextroamphetamine 20 MG per tablet   Commonly known as:  ADDERALL   Used for:  Attention deficit hyperactivity disorder (ADHD), combined type   Replaces:  amphetamine-dextroamphetamine 20 MG per 24 hr capsule   Started by:  More Lao MD        Dose:  20 mg   Start taking on:  5/11/2018   Take 1 tablet (20 mg) by mouth daily   Quantity:  30 tablet   Refills:  0       buPROPion 150 MG 24 hr tablet   Commonly known as:  WELLBUTRIN XL   Used for:  Tobacco abuse disorder   Started by:  More Lao MD        Dose:  150 mg   Take 1 tablet (150 mg) by mouth every morning   Quantity:  90 tablet   Refills:  1         Stop taking these medicines if you haven't already. Please contact your care team if you have questions.     amphetamine-dextroamphetamine 20 MG per 24 hr capsule   Commonly known as:  ADDERALL XR   Replaced by:  amphetamine-dextroamphetamine 20 MG per tablet   Stopped by:  More Lao MD                Where to get your medicines      These medications were sent to [address] [address]     Phone:  [phone]     buPROPion 150 MG 24 hr tablet         Some of these will need a paper prescription and others can be bought over the counter.  Ask your nurse if you have questions.     Bring a paper prescription for each of these medications     amphetamine-dextroamphetamine 20 MG per tablet                Primary Care Provider Office Phone # Fax #    More Lao [phone]       [address] [place], [place] [id number]        Equal Access to Services     [facility] AH: Hadii bela morel hadasho Soomaali, waaxda luqadaha, qaybta kaalmada adeegyada, hannah ceballos. So [facility] [place] [phone].    ATENCIÓN: Si habla español, tiene a andujar disposición servicios gratuitos de asistencia lingüística. Llame al [phone].    We comply with applicable  federal civil rights laws and [place] laws. We do not discriminate on the basis of race, color, national origin, age, disability, sex, sexual orientation, or gender identity.            Thank you!     Thank you for choosing [facility]  for your care. Our goal is always to provide you with excellent care. Hearing back from our patients is one way we can continue to improve our services. Please take a few minutes to complete the written survey that you may receive in the mail after your visit with us. Thank you!             Your Updated Medication List - Protect others around you: Learn how to safely use, store and throw away your medicines at www.disposemymeds.org.          This list is accurate as of 3/12/18  1:50 PM.  Always use your most recent med list.                   Brand Name Dispense Instructions for use Diagnosis    amphetamine-dextroamphetamine 20 MG per tablet   Start taking on:  5/11/2018    ADDERALL    30 tablet    Take 1 tablet (20 mg) by mouth daily    Attention deficit hyperactivity disorder (ADHD), combined type       buPROPion 150 MG 24 hr tablet    WELLBUTRIN XL    90 tablet    Take 1 tablet (150 mg) by mouth every morning    Tobacco abuse disorder

## 2018-03-12 NOTE — PROGRESS NOTES
"  SUBJECTIVE:   Chelo Lopez is a 53 year old female who presents to clinic today for the following health issues:    HPI     SUBJECTIVE:  Patient is here today to recheck ADHD/ADD.    Updates since last visit: 5/8 has intake for [facility] for ADHD evaluation  Routine for taking medicine, including time: 10  Time medicine wears off: 4  Issues at school/Work: none  Issues at home: none  Control of symptoms: projects still with a number open -- planning [facility] evaluation    Side effects:  Headaches: No  Stomach aches: No  Irritability/mood swings: No  Difficulties with sleep: No  Social withdrawal: No  Unusual movements/tics: No  Decreased appetite: No    Other concerns: Wellbutrin -- would like refill as she is doing better with smoking cessation and seems to have better outlook on life with this, would like to continue. She reports that she only smokes very occasionally now, when she is out with her friends who are \"bad influences\" on her.      -------------------------------------  Problem list and histories reviewed & adjusted, as indicated.  Additional history: as documented    Patient Active Problem List   Diagnosis     Other disorder of menstruation and other abnormal bleeding from female genital tract     Tobacco use disorder     PMDD (premenstrual dysphoric disorder)     Menorrhagia     CARDIOVASCULAR SCREENING; LDL GOAL LESS THAN 160     ADHD (attention deficit hyperactivity disorder)     Osteoarthritis of hip     Past Surgical History:   Procedure Laterality Date     ARTHROPLASTY HIP  5/2/2012    Procedure:ARTHROPLASTY HIP; Right Total Hip Arthroplasty; Surgeon:DAGOBERTO CAMPBELL; [facility]     ARTHROPLASTY HIP Left 1/12/2015    Procedure: ARTHROPLASTY HIP;  Surgeon: Dagoberto Campbell MD;  [facility]     C NONSPECIFIC PROCEDURE  2001    cervical cone procedure?     C NONSPECIFIC PROCEDURE      rt salpingectomy (uncertain if oophorectomy)     COSMETIC MAMMOPLASTY AUGMENTATION BILATERAL      " "implants     CYSTOSCOPY  3/14/2011    CYSTOSCOPY performed by RICHARD MACKEY at  OR     HYSTERECTOMY  2011    menorrhagia     HYSTERECTOMY, PAP NO LONGER INDICATED       LAPAROSCOPIC ASSISTED HYSTERECTOMY VAGINAL  3/14/2011    LAPAROSCOPIC ASSISTED HYSTERECTOMY VAGINAL performed by RICHARD MACKEY at  OR     LAPAROSCOPY DIAGNOSTIC (GYN)  3/14/2011    LAPAROSCOPY DIAGNOSTIC (GYN) performed by RICHARD MACKEY at  OR     SURGICAL HISTORY OF -   1985    Laparotomy for the fallopian tube abscess        Social History   Substance Use Topics     Smoking status: Current Every Day Smoker     Packs/day: 0.50     Types: Cigarettes     Smokeless tobacco: Never Used      Comment: 1/2 pack weekly-socially only     Alcohol use Yes      Comment: 4 weekly     Family History   Problem Relation Age of Onset     CEREBROVASCULAR DISEASE Father      CANCER Sister      skin     C.A.D. No family hx of      Breast Cancer No family hx of      Cancer - colorectal No family hx of            ROS:  Constitutional, HEENT, cardiovascular, pulmonary, GI, , musculoskeletal, neuro, skin, endocrine and psych systems are negative, except as in HPI or otherwise noted.     This document serves as a record of the services and decisions personally performed and made by More Lao MD. It was created on her behalf by Apurva William, a trained medical scribe. The creation of this document is based the provider's statements to the medical scribe.  Apurva William, March 12, 2018 12:51 PM       OBJECTIVE:                                                    /70 (BP Location: Right arm, Patient Position: Chair, Cuff Size: Adult Regular)  Pulse 74  Temp 97.5  F (36.4  C) (Temporal)  Resp 12  Ht 1.638 m (5' 4.49\")  Wt 83.5 kg (184 lb)  LMP 03/07/2011  BMI 31.11 kg/m2  Body mass index is 31.11 kg/(m^2).   GENERAL: healthy, alert, well nourished, well hydrated, no distress, overweight  HENT: ear canals- normal; TMs- normal; Nose- normal; " Mouth- no ulcers, no lesions  NECK: no tenderness, no adenopathy, no asymmetry, no masses, no stiffness; thyroid- normal to palpation  RESP: lungs clear to auscultation - no rales, no rhonchi, no wheezes  CV: regular rates and rhythm, normal S1 S2, no S3 or S4 and no murmur, no click or rub -  ABDOMEN: soft, no tenderness, no  hepatosplenomegaly, no masses, normal bowel sounds  MS: extremities- no gross deformities noted, no edema  SKIN: no suspicious lesions, no rashes  PSYCH: Alert and oriented times 3; speech- coherent , normal rate and volume; able to articulate logical thoughts, able to abstract reason, no tangential thoughts, no hallucinations or delusions, affect- normal    Diagnostic test results:  No results found for this or any previous visit (from the past 24 hour(s)).     ASSESSMENT/PLAN:                                                        ICD-10-CM    1. Preop general physical exam Z01.818 EKG 12-lead complete w/read - Clinics   2. Screen for colon cancer Z12.11 Fecal colorectal cancer screen (FIT)   3. Visit for screening mammogram Z12.31    4. Attention deficit hyperactivity disorder (ADHD), combined type F90.2 amphetamine-dextroamphetamine (ADDERALL) 20 MG per tablet     DISCONTINUED: amphetamine-dextroamphetamine (ADDERALL) 20 MG per tablet     DISCONTINUED: amphetamine-dextroamphetamine (ADDERALL) 20 MG per tablet   5. Tobacco abuse disorder Z72.0 buPROPion (WELLBUTRIN XL) 150 MG 24 hr tablet     DISCONTINUED: buPROPion (WELLBUTRIN XL) 150 MG 24 hr tablet     ADHD: Doing well today. Still struggling with motivation to do her projects. Will keep her medications the same at this time and follow up in 3 months. She also reports that the Wellbutrin is improving her mood in addition to helping her quit smoking. Will continue with this. Refilled her Adderall and Wellbutrin.         Patient instructions discussed with patient    The information in this document, created by the medical scribe for me,  accurately reflects the services I personally performed and the decisions made by me. I have reviewed and approved this document for accuracy.   MD More Escalante MD, MD  [facility]

## 2018-03-12 NOTE — H&P (VIEW-ONLY)
[address]  [facility] [address]  [phone]  Dept: [phone]    PRE-OP EVALUATION:  Today's date: 3/12/2018    Chelo Lopez (: 1965) presents for pre-operative evaluation assessment as requested by Dr. Flores.  She requires evaluation and anesthesia risk assessment prior to undergoing surgery/procedure for  Cosmetic liposuction and abdominoplasty.    Proposed Surgery/ Procedure: Cosmetic Liposuction and abdominoplasty  Date of Surgery/ Procedure: 3/13/2018  Time of Surgery/ Procedure: 6:30am  Hospital/Surgical Facility: [facility]  Fax number for surgical facility: [phone]  Primary Physician: More Lao  Type of Anesthesia Anticipated: General    Patient has a Health Care Directive or Living Will:  NO    1. NO - Do you have a history of heart attack, stroke, stent, bypass or surgery on an artery in the head, neck, heart or legs?  2. NO - Do you ever have any pain or discomfort in your chest?  3. NO - Do you have a history of  Heart Failure?  4. NO - Are you troubled by shortness of breath when: walking on the level, up a slight hill or at night?  5. NO - Do you currently have a cold, bronchitis or other respiratory infection?  6. NO - Do you have a cough, shortness of breath or wheezing?  7. NO - Do you sometimes get pains in the calves of your legs when you walk?  8. NO - Do you or anyone in your family have previous history of blood clots?  9. NO - Do you or does anyone in your family have a serious bleeding problem such as prolonged bleeding following surgeries or cuts?  10. NO - Have you ever had problems with anemia or been told to take iron pills?  11. NO - Have you had any abnormal blood loss such as black, tarry or bloody stools, or abnormal vaginal bleeding?  12. YES - HAVE YOU EVER HAD A BLOOD TRANSFUSION? With hysteroectomy  13. NO - Have you or any of your relatives ever had problems with anesthesia?  14. NO - Do you have  "sleep apnea, excessive snoring or daytime drowsiness?  15. NO - Do you have any prosthetic heart valves?  16. YES - DO YOU HAVE PROSTHETIC JOINTS? Total hip replacement, bilateral  17. NO - Is there any chance that you may be pregnant?      HPI:     HPI related to upcoming procedure: Patient has been saving up for some time to have this cosmetic procedure done. She is planning to have liposuction on her trunk, and a \"tummy tuck\" with Dr. Flores.       See problem list for active medical problems.  Problems all longstanding and stable, except as noted/documented.  See ROS for pertinent symptoms related to these conditions.                                                                                                  .    MEDICAL HISTORY:     Patient Active Problem List    Diagnosis Date Noted     Osteoarthritis of hip 01/12/2015     Priority: Medium     Do you wish to do the replacement in the background? yes         ADHD (attention deficit hyperactivity disorder) 09/11/2011     Priority: Medium     Diagnosed at [facility].  Psychological assessment on 9/11/11.  See scanned copy under \"Mental Health Referral\"  ADHD agreement on file 9/30/2013        CARDIOVASCULAR SCREENING; LDL GOAL LESS THAN 160 10/31/2010     Priority: Medium     PMDD (premenstrual dysphoric disorder) 09/16/2008     Priority: Medium     Menorrhagia 09/16/2008     Priority: Medium     Tobacco use disorder      Priority: Medium     Other disorder of menstruation and other abnormal bleeding from female genital tract      Priority: Medium      Past Medical History:   Diagnosis Date     Other disorder of menstruation and other abnormal bleeding from female genital tract      Tobacco use disorder      Past Surgical History:   Procedure Laterality Date     ARTHROPLASTY HIP  5/2/2012    Procedure:ARTHROPLASTY HIP; Right Total Hip Arthroplasty; Surgeon:TROY CAMPBELL; [facility]     ARTHROPLASTY HIP Left 1/12/2015    Procedure: " ARTHROPLASTY HIP;  Surgeon: Dagoberto Waddell MD;  [facility]     C NONSPECIFIC PROCEDURE  2001    cervical cone procedure?     C NONSPECIFIC PROCEDURE      rt salpingectomy (uncertain if oophorectomy)     COSMETIC MAMMOPLASTY AUGMENTATION BILATERAL      implants     CYSTOSCOPY  3/14/2011    CYSTOSCOPY performed by RICHARD MACKEY at  OR     HYSTERECTOMY  2011    menorrhagia     HYSTERECTOMY, PAP NO LONGER INDICATED       LAPAROSCOPIC ASSISTED HYSTERECTOMY VAGINAL  3/14/2011    LAPAROSCOPIC ASSISTED HYSTERECTOMY VAGINAL performed by RICHARD MACKEY at  OR     LAPAROSCOPY DIAGNOSTIC (GYN)  3/14/2011    LAPAROSCOPY DIAGNOSTIC (GYN) performed by RICHARD MACKEY at  OR     SURGICAL HISTORY OF -   1985    Laparotomy for the fallopian tube abscess      Current Outpatient Prescriptions   Medication Sig Dispense Refill     buPROPion (WELLBUTRIN XL) 150 MG 24 hr tablet Take 1 tablet (150 mg) by mouth every morning 30 tablet 1     amphetamine-dextroamphetamine (ADDERALL) 20 MG per tablet Take 1 tablet (20 mg) by mouth daily 30 tablet 0     amphetamine-dextroamphetamine (ADDERALL XR) 20 MG per 24 hr capsule Take 2 capsules (40 mg) by mouth daily 40 capsule 0     OTC products: None, except as noted above    Allergies   Allergen Reactions     Bees      No Known Allergies       Latex Allergy: NO    Social History   Substance Use Topics     Smoking status: Current Every Day Smoker     Packs/day: 0.50     Types: Cigarettes     Smokeless tobacco: Never Used      Comment: 1/2 pack weekly-socially only     Alcohol use Yes      Comment: 4 weekly     History   Drug Use No       REVIEW OF SYSTEMS:     Constitutional, HEENT, cardiovascular, pulmonary, GI, , musculoskeletal, neuro, skin, endocrine and psych systems are negative, except as in HPI or otherwise noted.     This document serves as a record of the services and decisions personally performed and made by oMre Lao MD. It was created on her behalf by  "Apurva William, a trained medical scribe. The creation of this document is based the provider's statements to the medical scribe.  Apurva William, March 12, 2018 12:58 PM     EXAM:   /70 (BP Location: Right arm, Patient Position: Chair, Cuff Size: Adult Regular)  Pulse 74  Temp 97.5  F (36.4  C) (Temporal)  Resp 12  Ht 1.638 m (5' 4.49\")  Wt 83.5 kg (184 lb)  LMP 03/07/2011  BMI 31.11 kg/m2    GENERAL APPEARANCE: healthy, alert and no distress     EYES: EOMI, PERRL     HENT: ear canals and TM's normal and nose and mouth without ulcers or lesions     NECK: no adenopathy, no asymmetry, masses, or scars and thyroid normal to palpation     RESP: lungs clear to auscultation - no rales, rhonchi or wheezes     CV: regular rates and rhythm, normal S1 S2, no S3 or S4 and no murmur, click or rub     ABDOMEN:  soft, nontender, no HSM or masses and bowel sounds normal     MS: extremities normal- no gross deformities noted, no evidence of inflammation in joints, FROM in all extremities.     SKIN: no suspicious lesions or rashes to visible skin     NEURO: Normal strength and tone, sensory exam grossly normal, mentation intact and speech normal     PSYCH: mentation appears normal. and affect normal/bright     LYMPHATICS: No cervical adenopathy    DIAGNOSTICS:     EKG: appears normal, NSR, normal axis, normal intervals, no acute ST/T changes c/w ischemia, no LVH by voltage criteria, unchanged from previous tracings    Labs Resulted Today:   Results for orders placed or performed in visit on 06/28/17   Pain Drug Scr UR W Rptd Meds   Result Value Ref Range    Pain Drug SCR UR W RPTD Meds       FINAL  (Note)  ====================================================================  [facility] COMP DRUG ANALYSIS,UR  ====================================================================  Test                             Result       Flag       Units  Drug Present and Declared for Prescription Verification   Amphetamine                    " 73170        EXPECTED   ng/mg creat    Amphetamine is available as a schedule II prescription drug.    ====================================================================  Test                      Result    Flag   Units      Ref Range   Creatinine              30               mg/dL      >=20  ====================================================================  Declared Medications:  The flagging and interpretation on this report are based on the  following declared medications.  Unexpected results may arise from  inaccuracies in the declared medications.    **Note: The testing scope of this panel includes these medications:    Amphetamine (Adderal l)  ====================================================================  For clinical consultation, please call [phone].  ====================================================================  Analysis performed by [employer] [place] [address]       Labs Drawn and in Process:   Unresulted Labs Ordered in the Past 30 Days of this Admission     No orders found from 1/11/2018 to 3/13/2018.          Recent Labs   Lab Test  01/15/15   0634  01/14/15   0653  05/24/12 05/17/12   HGB  10.3*  10.2*   < >   --    --    PLT  182  178   < >   --    --    INR   --    --    --   1.5  1.5   NA  142  142   < >   --    --    POTASSIUM  3.7  3.5   < >   --    --    CR  0.74  0.74   < >   --    --     < > = values in this interval not displayed.     IMPRESSION:   Reason for surgery/procedure: Cosmetic/ Liposuction and abdominoplasty  Diagnosis/reason for consult: Healthy 53 year old female with stable, longstanding conditions/ Pre-op evaluation prior to cosmetic abdominal surgery.     The proposed surgical procedure is considered INTERMEDIATE risk.    REVISED CARDIAC RISK INDEX  The patient has the following serious cardiovascular risks for perioperative complications such as (MI, PE, VFib and 3  AV Block):  No serious cardiac risks  INTERPRETATION: 0 risks: Class  I (very low risk - 0.4% complication rate)    The patient has the following additional risks for perioperative complications:  No identified additional risks      ICD-10-CM    1. Preop general physical exam Z01.818 EKG 12-lead complete w/read - Clinics     HONORING CHOICES REFERRAL   2. Screen for colon cancer Z12.11 Fecal colorectal cancer screen (FIT)   3. Visit for screening mammogram Z12.31 MA SCREENING DIGITAL BILAT - Future  (s+30)   4. Attention deficit hyperactivity disorder (ADHD), combined type F90.2 amphetamine-dextroamphetamine (ADDERALL) 20 MG per tablet   5. Tobacco abuse disorder Z72.0        RECOMMENDATIONS:     --Consult hospital rounder / IM to assist post-op medical management    --Patient is to take all scheduled medications on the day of surgery EXCEPT for modifications listed below.    Hold your Adderall the day of surgery.    APPROVAL GIVEN to proceed with proposed procedure, without further diagnostic evaluation     The information in this document, created by the medical scribe for me, accurately reflects the services I personally performed and the decisions made by me. I have reviewed and approved this document for accuracy.   More Lao MD     Signed Electronically by: More Lao MD, MD    Copy of this evaluation report is provided to requesting physician.    [facility] Preop Guidelines

## 2018-03-13 ENCOUNTER — ANESTHESIA (OUTPATIENT)
Dept: SURGERY | Facility: CLINIC | Age: 53
End: 2018-03-13

## 2018-03-13 ENCOUNTER — ANESTHESIA EVENT (OUTPATIENT)
Dept: SURGERY | Facility: CLINIC | Age: 53
End: 2018-03-13

## 2018-03-13 ENCOUNTER — HOSPITAL ENCOUNTER (OUTPATIENT)
Facility: CLINIC | Age: 53
Discharge: HOME | End: 2018-03-13
Attending: PLASTIC SURGERY | Admitting: PLASTIC SURGERY

## 2018-03-13 VITALS
OXYGEN SATURATION: 93 % | DIASTOLIC BLOOD PRESSURE: 72 MMHG | HEIGHT: 65 IN | WEIGHT: 184.8 LBS | RESPIRATION RATE: 16 BRPM | BODY MASS INDEX: 30.79 KG/M2 | TEMPERATURE: 96.9 F | SYSTOLIC BLOOD PRESSURE: 121 MMHG

## 2018-03-13 DIAGNOSIS — E88.1 LIPODYSTROPHY: Primary | ICD-10-CM

## 2018-03-13 PROCEDURE — 27210794 ZZH OR GENERAL SUPPLY STERILE: Performed by: PLASTIC SURGERY

## 2018-03-13 PROCEDURE — 71000013 ZZH RECOVERY PHASE 1 LEVEL 1 EA ADDTL HR: Performed by: PLASTIC SURGERY

## 2018-03-13 PROCEDURE — 37000008 ZZH ANESTHESIA TECHNICAL FEE, 1ST 30 MIN: Performed by: PLASTIC SURGERY

## 2018-03-13 PROCEDURE — 71000012 ZZH RECOVERY PHASE 1 LEVEL 1 FIRST HR: Performed by: PLASTIC SURGERY

## 2018-03-13 PROCEDURE — 25000128 H RX IP 250 OP 636: Performed by: ANESTHESIOLOGY

## 2018-03-13 PROCEDURE — 25000566 ZZH SEVOFLURANE, EA 15 MIN: Performed by: PLASTIC SURGERY

## 2018-03-13 PROCEDURE — 36000056 ZZH SURGERY LEVEL 3 1ST 30 MIN: Performed by: PLASTIC SURGERY

## 2018-03-13 PROCEDURE — 25000125 ZZHC RX 250: Performed by: NURSE ANESTHETIST, CERTIFIED REGISTERED

## 2018-03-13 PROCEDURE — 25000132 ZZH RX MED GY IP 250 OP 250 PS 637: Performed by: PLASTIC SURGERY

## 2018-03-13 PROCEDURE — 27210995 ZZH RX 272: Performed by: PLASTIC SURGERY

## 2018-03-13 PROCEDURE — 40000170 ZZH STATISTIC PRE-PROCEDURE ASSESSMENT II: Performed by: PLASTIC SURGERY

## 2018-03-13 PROCEDURE — 36000058 ZZH SURGERY LEVEL 3 EA 15 ADDTL MIN: Performed by: PLASTIC SURGERY

## 2018-03-13 PROCEDURE — 25000128 H RX IP 250 OP 636: Performed by: NURSE ANESTHETIST, CERTIFIED REGISTERED

## 2018-03-13 PROCEDURE — 71000027 ZZH RECOVERY PHASE 2 EACH 15 MINS: Performed by: PLASTIC SURGERY

## 2018-03-13 PROCEDURE — 25000125 ZZHC RX 250: Performed by: PLASTIC SURGERY

## 2018-03-13 PROCEDURE — 37000009 ZZH ANESTHESIA TECHNICAL FEE, EACH ADDTL 15 MIN: Performed by: PLASTIC SURGERY

## 2018-03-13 PROCEDURE — 25000128 H RX IP 250 OP 636: Performed by: PLASTIC SURGERY

## 2018-03-13 RX ORDER — CEFAZOLIN SODIUM 2 G/100ML
2 INJECTION, SOLUTION INTRAVENOUS
Status: COMPLETED | OUTPATIENT
Start: 2018-03-13 | End: 2018-03-13

## 2018-03-13 RX ORDER — OXYCODONE HCL 10 MG/1
10 TABLET, FILM COATED, EXTENDED RELEASE ORAL ONCE
Status: COMPLETED | OUTPATIENT
Start: 2018-03-13 | End: 2018-03-13

## 2018-03-13 RX ORDER — MAGNESIUM HYDROXIDE 1200 MG/15ML
LIQUID ORAL PRN
Status: DISCONTINUED | OUTPATIENT
Start: 2018-03-13 | End: 2018-03-13 | Stop reason: HOSPADM

## 2018-03-13 RX ORDER — ONDANSETRON 2 MG/ML
4 INJECTION INTRAMUSCULAR; INTRAVENOUS EVERY 30 MIN PRN
Status: DISCONTINUED | OUTPATIENT
Start: 2018-03-13 | End: 2018-03-13 | Stop reason: HOSPADM

## 2018-03-13 RX ORDER — KETOROLAC TROMETHAMINE 30 MG/ML
30 INJECTION, SOLUTION INTRAMUSCULAR; INTRAVENOUS ONCE
Status: COMPLETED | OUTPATIENT
Start: 2018-03-13 | End: 2018-03-13

## 2018-03-13 RX ORDER — ACETAMINOPHEN 500 MG
1000 TABLET ORAL ONCE
Status: COMPLETED | OUTPATIENT
Start: 2018-03-13 | End: 2018-03-13

## 2018-03-13 RX ORDER — SODIUM CHLORIDE, SODIUM LACTATE, POTASSIUM CHLORIDE, CALCIUM CHLORIDE 600; 310; 30; 20 MG/100ML; MG/100ML; MG/100ML; MG/100ML
INJECTION, SOLUTION INTRAVENOUS CONTINUOUS PRN
Status: DISCONTINUED | OUTPATIENT
Start: 2018-03-13 | End: 2018-03-13

## 2018-03-13 RX ORDER — HYDROMORPHONE HYDROCHLORIDE 1 MG/ML
.3-.5 INJECTION, SOLUTION INTRAMUSCULAR; INTRAVENOUS; SUBCUTANEOUS EVERY 10 MIN PRN
Status: DISCONTINUED | OUTPATIENT
Start: 2018-03-13 | End: 2018-03-13 | Stop reason: HOSPADM

## 2018-03-13 RX ORDER — BUPIVACAINE HYDROCHLORIDE 2.5 MG/ML
INJECTION, SOLUTION EPIDURAL; INFILTRATION; INTRACAUDAL
Status: DISCONTINUED
Start: 2018-03-13 | End: 2018-03-13 | Stop reason: HOSPADM

## 2018-03-13 RX ORDER — CEFAZOLIN SODIUM 1 G
1 VIAL (EA) INJECTION SEE ADMIN INSTRUCTIONS
Status: DISCONTINUED | OUTPATIENT
Start: 2018-03-13 | End: 2018-03-13 | Stop reason: HOSPADM

## 2018-03-13 RX ORDER — BUPIVACAINE HYDROCHLORIDE 2.5 MG/ML
INJECTION, SOLUTION INFILTRATION; PERINEURAL PRN
Status: DISCONTINUED | OUTPATIENT
Start: 2018-03-13 | End: 2018-03-13 | Stop reason: HOSPADM

## 2018-03-13 RX ORDER — LIDOCAINE HYDROCHLORIDE 20 MG/ML
INJECTION, SOLUTION INFILTRATION; PERINEURAL PRN
Status: DISCONTINUED | OUTPATIENT
Start: 2018-03-13 | End: 2018-03-13

## 2018-03-13 RX ORDER — PROPOFOL 10 MG/ML
INJECTION, EMULSION INTRAVENOUS PRN
Status: DISCONTINUED | OUTPATIENT
Start: 2018-03-13 | End: 2018-03-13

## 2018-03-13 RX ORDER — PROPOFOL 10 MG/ML
INJECTION, EMULSION INTRAVENOUS CONTINUOUS PRN
Status: DISCONTINUED | OUTPATIENT
Start: 2018-03-13 | End: 2018-03-13

## 2018-03-13 RX ORDER — NEOSTIGMINE METHYLSULFATE 1 MG/ML
VIAL (ML) INJECTION PRN
Status: DISCONTINUED | OUTPATIENT
Start: 2018-03-13 | End: 2018-03-13

## 2018-03-13 RX ORDER — OXYCODONE HYDROCHLORIDE 5 MG/1
5 TABLET ORAL ONCE
Status: COMPLETED | OUTPATIENT
Start: 2018-03-13 | End: 2018-03-13

## 2018-03-13 RX ORDER — NALOXONE HYDROCHLORIDE 0.4 MG/ML
.1-.4 INJECTION, SOLUTION INTRAMUSCULAR; INTRAVENOUS; SUBCUTANEOUS
Status: DISCONTINUED | OUTPATIENT
Start: 2018-03-13 | End: 2018-03-13 | Stop reason: HOSPADM

## 2018-03-13 RX ORDER — GLYCOPYRROLATE 0.2 MG/ML
INJECTION, SOLUTION INTRAMUSCULAR; INTRAVENOUS PRN
Status: DISCONTINUED | OUTPATIENT
Start: 2018-03-13 | End: 2018-03-13

## 2018-03-13 RX ORDER — DEXAMETHASONE SODIUM PHOSPHATE 4 MG/ML
INJECTION, SOLUTION INTRA-ARTICULAR; INTRALESIONAL; INTRAMUSCULAR; INTRAVENOUS; SOFT TISSUE PRN
Status: DISCONTINUED | OUTPATIENT
Start: 2018-03-13 | End: 2018-03-13

## 2018-03-13 RX ORDER — EPHEDRINE SULFATE 50 MG/ML
INJECTION, SOLUTION INTRAMUSCULAR; INTRAVENOUS; SUBCUTANEOUS PRN
Status: DISCONTINUED | OUTPATIENT
Start: 2018-03-13 | End: 2018-03-13

## 2018-03-13 RX ORDER — MEPERIDINE HYDROCHLORIDE 25 MG/ML
12.5 INJECTION INTRAMUSCULAR; INTRAVENOUS; SUBCUTANEOUS
Status: DISCONTINUED | OUTPATIENT
Start: 2018-03-13 | End: 2018-03-13 | Stop reason: HOSPADM

## 2018-03-13 RX ORDER — FENTANYL CITRATE 50 UG/ML
25-50 INJECTION, SOLUTION INTRAMUSCULAR; INTRAVENOUS EVERY 5 MIN PRN
Status: DISCONTINUED | OUTPATIENT
Start: 2018-03-13 | End: 2018-03-13 | Stop reason: HOSPADM

## 2018-03-13 RX ORDER — ONDANSETRON 4 MG/1
4 TABLET, ORALLY DISINTEGRATING ORAL EVERY 30 MIN PRN
Status: DISCONTINUED | OUTPATIENT
Start: 2018-03-13 | End: 2018-03-13 | Stop reason: HOSPADM

## 2018-03-13 RX ORDER — SODIUM CHLORIDE, SODIUM LACTATE, POTASSIUM CHLORIDE, CALCIUM CHLORIDE 600; 310; 30; 20 MG/100ML; MG/100ML; MG/100ML; MG/100ML
INJECTION, SOLUTION INTRAVENOUS CONTINUOUS
Status: DISCONTINUED | OUTPATIENT
Start: 2018-03-13 | End: 2018-03-13 | Stop reason: HOSPADM

## 2018-03-13 RX ORDER — FENTANYL CITRATE 50 UG/ML
25-50 INJECTION, SOLUTION INTRAMUSCULAR; INTRAVENOUS
Status: DISCONTINUED | OUTPATIENT
Start: 2018-03-13 | End: 2018-03-13 | Stop reason: HOSPADM

## 2018-03-13 RX ORDER — FENTANYL CITRATE 50 UG/ML
INJECTION, SOLUTION INTRAMUSCULAR; INTRAVENOUS PRN
Status: DISCONTINUED | OUTPATIENT
Start: 2018-03-13 | End: 2018-03-13

## 2018-03-13 RX ORDER — GABAPENTIN 300 MG/1
600 CAPSULE ORAL ONCE
Status: COMPLETED | OUTPATIENT
Start: 2018-03-13 | End: 2018-03-13

## 2018-03-13 RX ORDER — OXYCODONE HYDROCHLORIDE 5 MG/1
5-10 TABLET ORAL EVERY 4 HOURS PRN
Qty: 30 TABLET | Refills: 0 | Status: SHIPPED | OUTPATIENT
Start: 2018-03-13 | End: 2019-02-04

## 2018-03-13 RX ORDER — ONDANSETRON 2 MG/ML
INJECTION INTRAMUSCULAR; INTRAVENOUS PRN
Status: DISCONTINUED | OUTPATIENT
Start: 2018-03-13 | End: 2018-03-13

## 2018-03-13 RX ADMIN — DEXMEDETOMIDINE HYDROCHLORIDE 4 MCG: 100 INJECTION, SOLUTION INTRAVENOUS at 09:46

## 2018-03-13 RX ADMIN — ROCURONIUM BROMIDE 40 MG: 10 INJECTION INTRAVENOUS at 08:06

## 2018-03-13 RX ADMIN — DEXMEDETOMIDINE HYDROCHLORIDE 4 MCG: 100 INJECTION, SOLUTION INTRAVENOUS at 09:42

## 2018-03-13 RX ADMIN — GLYCOPYRROLATE 0.4 MG: 0.2 INJECTION, SOLUTION INTRAMUSCULAR; INTRAVENOUS at 10:58

## 2018-03-13 RX ADMIN — ONDANSETRON 4 MG: 2 INJECTION INTRAMUSCULAR; INTRAVENOUS at 10:49

## 2018-03-13 RX ADMIN — HYDROMORPHONE HYDROCHLORIDE 0.5 MG: 1 INJECTION, SOLUTION INTRAMUSCULAR; INTRAVENOUS; SUBCUTANEOUS at 12:12

## 2018-03-13 RX ADMIN — Medication 5 MG: at 08:16

## 2018-03-13 RX ADMIN — LIDOCAINE HYDROCHLORIDE 60 MG: 20 INJECTION, SOLUTION INFILTRATION; PERINEURAL at 08:06

## 2018-03-13 RX ADMIN — ACETAMINOPHEN 1000 MG: 500 TABLET, FILM COATED ORAL at 07:32

## 2018-03-13 RX ADMIN — GABAPENTIN 600 MG: 300 CAPSULE ORAL at 07:33

## 2018-03-13 RX ADMIN — DEXMEDETOMIDINE HYDROCHLORIDE 4 MCG: 100 INJECTION, SOLUTION INTRAVENOUS at 09:47

## 2018-03-13 RX ADMIN — MIDAZOLAM 2 MG: 1 INJECTION INTRAMUSCULAR; INTRAVENOUS at 08:05

## 2018-03-13 RX ADMIN — Medication 5 MG: at 09:15

## 2018-03-13 RX ADMIN — OXYCODONE HYDROCHLORIDE 5 MG: 5 TABLET ORAL at 12:54

## 2018-03-13 RX ADMIN — PROPOFOL 225 MG: 10 INJECTION, EMULSION INTRAVENOUS at 08:06

## 2018-03-13 RX ADMIN — SODIUM CHLORIDE, POTASSIUM CHLORIDE, SODIUM LACTATE AND CALCIUM CHLORIDE: 600; 310; 30; 20 INJECTION, SOLUTION INTRAVENOUS at 10:42

## 2018-03-13 RX ADMIN — FENTANYL CITRATE 50 MCG: 50 INJECTION, SOLUTION INTRAMUSCULAR; INTRAVENOUS at 11:20

## 2018-03-13 RX ADMIN — Medication 5 MG: at 08:51

## 2018-03-13 RX ADMIN — ROCURONIUM BROMIDE 10 MG: 10 INJECTION INTRAVENOUS at 09:05

## 2018-03-13 RX ADMIN — DEXMEDETOMIDINE HYDROCHLORIDE 4 MCG: 100 INJECTION, SOLUTION INTRAVENOUS at 09:48

## 2018-03-13 RX ADMIN — DEXAMETHASONE SODIUM PHOSPHATE 4 MG: 4 INJECTION, SOLUTION INTRA-ARTICULAR; INTRALESIONAL; INTRAMUSCULAR; INTRAVENOUS; SOFT TISSUE at 10:26

## 2018-03-13 RX ADMIN — FENTANYL CITRATE 50 MCG: 50 INJECTION, SOLUTION INTRAMUSCULAR; INTRAVENOUS at 10:33

## 2018-03-13 RX ADMIN — DEXMEDETOMIDINE HYDROCHLORIDE 4 MCG: 100 INJECTION, SOLUTION INTRAVENOUS at 09:44

## 2018-03-13 RX ADMIN — KETOROLAC TROMETHAMINE 30 MG: 30 INJECTION, SOLUTION INTRAMUSCULAR at 12:18

## 2018-03-13 RX ADMIN — SODIUM CHLORIDE, POTASSIUM CHLORIDE, SODIUM LACTATE AND CALCIUM CHLORIDE: 600; 310; 30; 20 INJECTION, SOLUTION INTRAVENOUS at 08:03

## 2018-03-13 RX ADMIN — FENTANYL CITRATE 50 MCG: 50 INJECTION, SOLUTION INTRAMUSCULAR; INTRAVENOUS at 08:25

## 2018-03-13 RX ADMIN — FENTANYL CITRATE 50 MCG: 50 INJECTION, SOLUTION INTRAMUSCULAR; INTRAVENOUS at 08:59

## 2018-03-13 RX ADMIN — Medication 5 MG: at 08:13

## 2018-03-13 RX ADMIN — PROPOFOL 200 MCG/KG/MIN: 10 INJECTION, EMULSION INTRAVENOUS at 08:06

## 2018-03-13 RX ADMIN — CEFAZOLIN SODIUM 2 G: 2 INJECTION, SOLUTION INTRAVENOUS at 08:08

## 2018-03-13 RX ADMIN — NEOSTIGMINE METHYLSULFATE 2 MG: 1 INJECTION INTRAMUSCULAR; INTRAVENOUS; SUBCUTANEOUS at 10:58

## 2018-03-13 RX ADMIN — Medication 5 MG: at 10:28

## 2018-03-13 RX ADMIN — CEFAZOLIN 1 G: 1 INJECTION, POWDER, FOR SOLUTION INTRAMUSCULAR; INTRAVENOUS at 10:05

## 2018-03-13 RX ADMIN — Medication 5 MG: at 09:03

## 2018-03-13 RX ADMIN — FENTANYL CITRATE 50 MCG: 50 INJECTION, SOLUTION INTRAMUSCULAR; INTRAVENOUS at 08:05

## 2018-03-13 RX ADMIN — OXYCODONE HYDROCHLORIDE 10 MG: 10 TABLET, FILM COATED, EXTENDED RELEASE ORAL at 07:33

## 2018-03-13 RX ADMIN — SODIUM CHLORIDE, POTASSIUM CHLORIDE, SODIUM LACTATE AND CALCIUM CHLORIDE: 600; 310; 30; 20 INJECTION, SOLUTION INTRAVENOUS at 09:07

## 2018-03-13 ASSESSMENT — LIFESTYLE VARIABLES: TOBACCO_USE: 1

## 2018-03-13 NOTE — IP AVS SNAPSHOT
[facility]    [address]    [place] [address]    Phone:  [phone]    Fax:  [phone]                                       After Visit Summary   3/13/2018    Chelo Lopez    MRN: [id number]           After Visit Summary Signature Page     I have received my discharge instructions, and my questions have been answered. I have discussed any challenges I see with this plan with the nurse or doctor.    ..........................................................................................................................................  Patient/Patient Representative Signature      ..........................................................................................................................................  Patient Representative Print Name and Relationship to Patient    ..................................................               ................................................  Date                                            Time    ..........................................................................................................................................  Reviewed by Signature/Title    ...................................................              ..............................................  Date                                                            Time

## 2018-03-13 NOTE — IP AVS SNAPSHOT
MRN:[id number]                      After Visit Summary   3/13/2018    Chelo Lopez    MRN: [id number]           Thank you!     Thank you for choosing [facility] for your care. Our goal is always to provide you with excellent care. Hearing back from our patients is one way we can continue to improve our services. Please take a few minutes to complete the written survey that you may receive in the mail after you visit with us. Thank you!        Patient Information     Date Of Birth          1965        About your hospital stay     You were admitted on:  March 13, 2018 [facility]    You were discharged on:  March 13, 2018        Reason for your hospital stay       Surgery.                  Who to Call     For medical emergencies, please call 911.  For non-urgent questions about your medical care, please call your primary care provider or clinic, [phone]  For questions related to your surgery, please call your surgery clinic        Attending Provider     Provider Jinny Sullivan MD Plastic Surgery       Primary Care Provider Office Phone # Fax #    More Dariana Lao [phone]      After Care Instructions     Discharge Instructions       Follow up with Dr. Flores in 1-2 weeks.            Wound care and dressings       Remove dressing 24-48 hours after surgery.  Then may shower with incisions uncovered. Apply Aquaphor to incisions daily. Avoid heavy lifting or strenuous exercise for 2 weeks. Abdominal binder at all times while active. Strip drains daily, record output daily. May use OTC ibuprofen/tylenol for discomfort. No smoking.                  Further instructions from your care team       Today you were given 1000 mg of Tylenol at 0730. The recommended daily maximum dose is 4000 mg.     Today you received Toradol, an antiinflammatory medication similar to Ibuprofen.  You should not take other  antiinflammatory medication, such as Ibuprofen, Motrin, Advil, Aleve, Naprosyn, etc, until 6:15pm.     Neurontin 600mg taken at 0730.  Oxycontin 10 mg taken at 0730    If dye was used during your procedure, your urine will initially be bright blue. It will gradually return to yellow throughout the day. Drinking plenty of fluids will help to filter the dye from your urine.      Same Day Surgery Discharge Instructions for  Sedation and General Anesthesia       It's not unusual to feel dizzy, light-headed or faint for up to 24 hours after surgery or while taking pain medication.  If you have these symptoms: sit for a few minutes before standing and have someone assist you when you get up to walk or use the bathroom.      You should rest and relax for the next 24 hours. We recommend you make arrangements to have an adult stay with you for at least 24 hours after your discharge.  Avoid hazardous and strenuous activity.      DO NOT DRIVE any vehicle or operate mechanical equipment for 24 hours following the end of your surgery.  Even though you may feel normal, your reactions may be affected by the medication you have received.      Do not drink alcoholic beverages for 24 hours following surgery.       Slowly progress to your regular diet as you feel able. It's not unusual to feel nauseated and/or vomit after receiving anesthesia.  If you develop these symptoms, drink clear liquids (apple juice, ginger ale, broth, 7-up, etc. ) until you feel better.  If your nausea and vomiting persists for 24 hours, please notify your surgeon.        All narcotic pain medications, along with inactivity and anesthesia, can cause constipation. Drinking plenty of liquids and increasing fiber intake will help.      For any questions of a medical nature, call your surgeon.      Do not make important decisions for 24 hours.      If you had general anesthesia, you may have a sore throat for a couple of days related to the breathing tube used  during surgery.  You may use Cepacol lozenges to help with this discomfort.  If it worsens or if you develop a fever, contact your surgeon.       If you feel your pain is not well managed with the pain medications prescribed by your surgeon, please contact your surgeon's office to let them know so they can address your concerns.     Discharge Instructions Following Abdominoplasty    Once home, follow any instructions you are given. Your doctor will tell you when you can return to your normal routine. During your recovery:  Take any prescribed medications as directed.  Care for your incisions and the dressing (bandage) over them as instructed by your doctor.  Avoid strenuous activity and exercise as directed.   Wear compression garments if directed by your surgeon  Don t drive until you are no longer taking prescription pain medication and your doctor says it s okay.   Follow-Up       Follow up with your surgeon as directed. Let your doctor know if you have any questions or concerns.  Call the Doctor If You Have Any of the Following:  Fever of 100.4 F or higher (or as directed by your doctor)  Symptoms of infection at an incision site such as increased redness or swelling, warmth, worsening pain, or foul-smelling drainage  Pain not relieved by medications  Problems with nausea or vomiting  Any questions or problems      **If you have questions or concerns about your procedure,  call  at [phone]**    Christopher Xie Drain  Home Care Instructions    What is a Christopher Xie (ANTONIO) Drain?  This is a small tube that connects to a bulb.  Its gentle suction removes extra fluid from a surgical wound.  Your doctor will remove the tube when the amount of fluid decreases.  The color and amount of fluid varies.  Right after surgery the fluid is bright red.  Over time, it changes to light pink and may become clear or the color of straw.    How should I care for my tube site?    Keep the skin around the tube dry.  Check  with your doctor about how to shower.  You may need to cover the site with plastic when you shower.  Or, it may be okay to let the site get wet and put on a clean bandage after you shower.      If the bandage gets wet, you will need to change it.  How should I care for the bulb?    Keep the bulb compressed at all times except while you empty it.     Attach the bulb to your clothing with tape and a safety pin.    Try to empty the bulb at the same time every day.  Empty the bulb at least once a day, or when the bulb becomes half full.  If it becomes too full, there will not be enough suction.    To empty the bulb:    Wash your hands.    Open the bulb cap.    Drain the fluid from the bulb into the measuring cup.  If you have two drains, use two cups.      Clean the mouth of the bulb with an alcohol wipe if your nurse told you to.    Squeeze the bulb (fold it in half before you close the bulb cap) If it does not stay compressed, call your nurse or clinic.    Write the amount of drainage on the drainage record (see back page).  If you have two drains, write the amount for each bulb.    Flush the drainage down the toilet.  Rinse the measuring cup.    Wash your hands.    When should I call my doctor?   Call your doctor if:    You have a fever over 101 F (38.3 C), taken under the tongue.     The drainage increases or smells bad.    The skin around your tube has increased redness, swelling, warmth or pain.    You have pus or fluid leaking at the tube site.    Your stitches break.    You think the tube is not draining.    The tube falls out.    You have any problems or concerns.    Your drainage record:    Empty your bulb at least once per day or when 1/2 to 1/3 full.  Write down the date, time and amount of drainage in each bulb.   Bring this record to each clinic visit.    Date Time Bulb 1: amount of  Drainage in (ml or cc) Bulb 2: amount of drainage (in ml or cc) Notes                                                   "      Pending Results     No orders found from 3/11/2018 to 3/14/2018.            Admission Information     Date & Time Provider Department Dept. Phone    3/13/2018 Jinny Flores MD [facility] Same Day Surgery [phone]      Your Vitals Were     Blood Pressure Temperature Respirations Height Weight Last Period    104/74 96.9  F (36.1  C) (Temporal) 11 1.638 m (5' 4.5\") 83.8 kg (184 lb 12.8 oz) 03/07/2011    Pulse Oximetry BMI (Body Mass Index)                94% 31.23 kg/m2          MyChart Information     "[employer] gives you secure access to your electronic health record. If you see a primary care provider, you can also send messages to your care team and make appointments. If you have questions, please call your primary care clinic.  If you do not have a primary care provider, please call [phone] and they will assist you.        Care EveryWhere ID     This is your Care EveryWhere ID. This could be used by other organizations to access your [facility] medical records  [id number]        Equal Access to Services     JODIE HOWE : Hadii bela Garcia, wasergioda conner, qatracy kaalaga quintero, hannah ceballos. So [facility] [phone].    ATENCIÓN: Si habla español, tiene a andujar disposición servicios gratuitos de asistencia lingüística. Antonio al [phone].    We comply with applicable federal civil rights laws and [place] laws. We do not discriminate on the basis of race, color, national origin, age, disability, sex, sexual orientation, or gender identity.               Review of your medicines      START taking        Dose / Directions    oxyCODONE IR 5 MG tablet   Commonly known as:  ROXICODONE   Used for:  Lipodystrophy        Dose:  5-10 mg   Take 1-2 tablets (5-10 mg) by mouth every 4 hours as needed for moderate to severe pain   Quantity:  30 tablet   Refills:  0         CONTINUE these medicines which have NOT CHANGED        Dose / Directions    " amphetamine-dextroamphetamine 20 MG per tablet   Commonly known as:  ADDERALL   Used for:  Attention deficit hyperactivity disorder (ADHD), combined type        Dose:  20 mg   Start taking on:  5/11/2018   Take 1 tablet (20 mg) by mouth daily   Quantity:  30 tablet   Refills:  0       buPROPion 150 MG 24 hr tablet   Commonly known as:  WELLBUTRIN XL   Used for:  Tobacco abuse disorder        Dose:  150 mg   Take 1 tablet (150 mg) by mouth every morning   Quantity:  90 tablet   Refills:  1            Where to get your medicines      Some of these will need a paper prescription and others can be bought over the counter. Ask your nurse if you have questions.     Bring a paper prescription for each of these medications     oxyCODONE IR 5 MG tablet                Protect others around you: Learn how to safely use, store and throw away your medicines at www.disposemymeds.org.        Information about OPIOIDS     PRESCRIPTION OPIOIDS: WHAT YOU NEED TO KNOW    Prescription opioids can be used to help relieve moderate to severe pain and are often prescribed following a surgery or injury, or for certain health conditions. These medications can be an important part of treatment but also come with serious risks. It is important to work with your health care provider to make sure you are getting the safest, most effective care.    WHAT ARE THE RISKS AND SIDE EFFECTS OF OPIOID USE?  Prescription opioids carry serious risks of addiction and overdose, especially with prolonged use. An opioid overdose, often marked by slowed breathing can cause sudden death. The use of prescription opioids can have a number of side effects as well, even when taken as directed:      Tolerance - meaning you might need to take more of a medication for the same pain relief    Physical dependence - meaning you have symptoms of withdrawal when a medication is stopped    Increased sensitivity to pain    Constipation    Nausea, vomiting, and dry  mouth    Sleepiness and dizziness    Confusion    Depression    Low levels of testosterone that can result in lower sex drive, energy, and strength    Itching and sweating    RISKS ARE GREATER WITH:    History of drug misuse, substance use disorder, or overdose    Mental health conditions (such as depression or anxiety)    Sleep apnea    Older age (65 years or older)    Pregnancy    Avoid alcohol while taking prescription opioids.   Also, unless specifically advised by your health care provider, medications to avoid include:    Benzodiazepines (such as Xanax or Valium)    Muscle relaxants (such as Soma or Flexeril)    Hypnotics (such as Ambien or Lunesta)    Other prescription opioids    KNOW YOUR OPTIONS:  Talk to your health care provider about ways to manage your pain that do not involve prescription opioids. Some of these options may actually work better and have fewer risks and side effects:    Pain relievers such as acetaminophen, ibuprofen, and naproxen    Some medications that are also used for depression or seizures    Physical therapy and exercise    Cognitive behavioral therapy, a psychological, goal-directed approach, in which patients learn how to modify physical, behavioral, and emotional triggers of pain and stress    IF YOU ARE PRESCRIBED OPIOIDS FOR PAIN:    Never take opioids in greater amounts or more often than prescribed    Follow up with your primary health care provider and work together to create a plan on how to manage your pain.    Talk about ways to help manage your pain that do not involve prescription opioids    Talk about all concerns and side effects    Help prevent misuse and abuse    Never sell or share prescription opioids    Never use another person's prescription opioids    Store prescription opioids in a secure place and out of reach of others (this may include visitors, children, friends, and family)    Visit www.cdc.gov/drugoverdose to learn about risks of opioid abuse and  overdose    If you believe you may be struggling with addiction, tell your health care provider and ask for guidance or call [facility]'s National Helpline at 0-828-322-HELP    LEARN MORE / www.cdc.gov/drugoverdose/prescribing/guideline.html    Safely dispose of unused prescription opioids: Find your local drug take-back programs and more information about the importance of safe disposal at www.doseofreality.mn.gov             Medication List: This is a list of all your medications and when to take them. Check marks below indicate your daily home schedule. Keep this list as a reference.      Medications           Morning Afternoon Evening Bedtime As Needed    amphetamine-dextroamphetamine 20 MG per tablet   Commonly known as:  ADDERALL   Take 1 tablet (20 mg) by mouth daily   Start taking on:  5/11/2018                                buPROPion 150 MG 24 hr tablet   Commonly known as:  WELLBUTRIN XL   Take 1 tablet (150 mg) by mouth every morning                                oxyCODONE IR 5 MG tablet   Commonly known as:  ROXICODONE   Take 1-2 tablets (5-10 mg) by mouth every 4 hours as needed for moderate to severe pain   Last time this was given:  5 mg on 3/13/2018 12:54 PM   Last time this was given:  Last dose given at 1300 ( 1 tablet)                Last dose given at 1:00 pm ( 1 tablet)

## 2018-03-13 NOTE — OP NOTE
PLASTIC SURGERY OPERATIVE NOTE  Patient Name:  Chelo Lopez     Date of Service:  3/13/2018     PREOPERATIVE DIAGNOSES:   1.  COSMETIC   2.  Abdominal pannus and posterior trunk lipodystrophy    POSTOPERATIVE DIAGNOSES:   1.  COSMETIC     2.  Abdominal pannus and posterior trunk lipodystrophy    SURGEON:  Jinny Flores MD   OR STAFF:  Circulator: Caitlin Cunha RN  Relief Circulator: Ghislaine Pritchett RN  Relief Scrub: Sandra Mcneal  Scrub Person: Tabatha Alexandriaprimo YUN     ANESTHESIA:  General     ESTIMATED BLOOD LOSS:  * No blood loss amount entered *   SPECIMEN(S):  * No specimens in log *     PROCEDURES:   1.  Abdominoplasty  2.  Posterior trunk liposuction      DESCRIPTION OF PROCEDURE:  Patient was marked for incisions and taken to the operating room.  General anesthesia was administered.  Patient was placed in the prone position the back area was prepped and draped in a sterile manner.  Tumescent was infiltrated and liposuction was completed following the preoperative markings using a 4 mm cannula.  After achieving smooth contours, port sites are closed with 5-0 nylon suture and the patient was turned to the supine position.    The abdomen was then prepped and draped in a sterile manner.  An incision was made along the lower abdomen following the preoperative markings.  Dissection was carried down to underlying abdominal wall.  Dissection was carried cephalad to the costal margin was reached.  The umbilicus was freed from surrounding skin and adipose tissue.  The patient had a modest rectus diastases and this was plicated using interrupted 0 Nurolon followed by running 0 PDS suture.  The maximum with the plication was 8 cm.  The plication was interrupted around the umbilicus.    Patient was then brought the semiupright position and excess skin along the lower abdomen was marked and then excised.  Hemostasis was achieved.  Additional adipose tissue was removed from below the superficial fascia  over the anterior iliac spine bilaterally.  The abdominal incision was then reapproximated interrupted 0 PDS in the superficial fascia.  This was tacked to the underlying abdominal wall.  A 15 Finnish ANTONIO drain was placed in the lower abdomen and a second drain was placed in the epigastric area.  The abdominal incision was then closed with interrupted 3-0 Monocryl in the subcutaneous tissue.    The new position for the umbilicus was marked.  A stab incision was made and tumescent was infiltrated following the preoperative markings.  Standard liposuction was completed with a 4 mm cannula.  After achieving smooth contours, a circular incision was made around for the umbilicus and the excess skin and adipose tissue was removed.  The umbilicus was matured with interrupted 4-0 Monocryl followed by interrupted 5-0 nylon in the skin.  Xeroform and light dressings were applied.  This was followed by an abdominal binder.    There was 780 cc of tumescence infiltrated and 1800 cc of lipoaspirate obtained.  The abdominal pannus weighed 1960 g.    IMPLANTS:  * No implants in log *    Jinny Flores MD  Plastic Surgery  [facility]  [phone] (office)

## 2018-03-13 NOTE — ANESTHESIA CARE TRANSFER NOTE
Patient: Chelo Lopez    Procedure(s):  COSMETIC ABDOMINOPLASTY, COSMETIC POSTERIOR TRUNK LIPOSUCTION. - Wound Class: I-Clean   - Wound Class: I-Clean    Diagnosis: COSMETIC  Diagnosis Additional Information: No value filed.    Anesthesia Type:   General, ETT     Note:  Airway :Face Mask  Patient transferred to:PACU  Comments: 1119:  To par responsive, dentition unchanged from pre-op.Handoff Report: Identifed the Patient, Identified the Reponsible Provider, Reviewed the pertinent medical history, Discussed the surgical course, Reviewed Intra-OP anesthesia mangement and issues during anesthesia, Set expectations for post-procedure period and Allowed opportunity for questions and acknowledgement of understanding      Vitals: (Last set prior to Anesthesia Care Transfer)    CRNA VITALS  3/13/2018 1048 - 3/13/2018 1118      3/13/2018             Pulse: 67    SpO2: 99 %    Resp Rate (set): 10                Electronically Signed By: LISSETT Kemp CRNA  March 13, 2018  11:18 AM

## 2018-03-13 NOTE — BRIEF OP NOTE
[facility] Brief Operative Note    Pre-operative diagnosis: COSMETIC   Post-operative diagnosis abdominal pannus, lipodystrophy     Procedure: Procedure(s):  COSMETIC ABDOMINOPLASTY, COSMETIC POSTERIOR TRUNK LIPOSUCTION. - Wound Class: I-Clean   - Wound Class: I-Clean   Surgeon(s): Surgeon(s) and Role:     * Jinny Flores MD - Primary   Estimated blood loss: 20 mL    Specimens: * No specimens in log *   Findings: See op note.

## 2018-03-13 NOTE — ANESTHESIA POSTPROCEDURE EVALUATION
Patient: Chelo Lopez    Procedure(s):  COSMETIC ABDOMINOPLASTY, COSMETIC POSTERIOR TRUNK LIPOSUCTION. - Wound Class: I-Clean   - Wound Class: I-Clean    Diagnosis:COSMETIC  Diagnosis Additional Information: No value filed.    Anesthesia Type:  General, ETT    Note:  Anesthesia Post Evaluation    Patient location during evaluation: PACU  Patient participation: Able to fully participate in evaluation  Level of consciousness: awake  Pain management: adequate  Airway patency: patent  Cardiovascular status: acceptable  Respiratory status: acceptable  Hydration status: acceptable  PONV: none     Anesthetic complications: None          Last vitals:  Vitals:    03/13/18 0709 03/13/18 1120 03/13/18 1130   BP: 127/80 106/62 104/63   Resp: 18 13 16   Temp: 36.6  C (97.9  F) 36.1  C (96.9  F)    SpO2: 98% 100% 100%         Electronically Signed By: Chris Stover MD  March 13, 2018  11:55 AM

## 2018-03-13 NOTE — DISCHARGE INSTRUCTIONS
Today you were given 1000 mg of Tylenol at 0730. The recommended daily maximum dose is 4000 mg.     Today you received Toradol, an antiinflammatory medication similar to Ibuprofen.  You should not take other antiinflammatory medication, such as Ibuprofen, Motrin, Advil, Aleve, Naprosyn, etc, until 6:15pm.     Neurontin 600mg taken at 0730.  Oxycontin 10 mg taken at 0730    If dye was used during your procedure, your urine will initially be bright blue. It will gradually return to yellow throughout the day. Drinking plenty of fluids will help to filter the dye from your urine.      Same Day Surgery Discharge Instructions for  Sedation and General Anesthesia       It's not unusual to feel dizzy, light-headed or faint for up to 24 hours after surgery or while taking pain medication.  If you have these symptoms: sit for a few minutes before standing and have someone assist you when you get up to walk or use the bathroom.      You should rest and relax for the next 24 hours. We recommend you make arrangements to have an adult stay with you for at least 24 hours after your discharge.  Avoid hazardous and strenuous activity.      DO NOT DRIVE any vehicle or operate mechanical equipment for 24 hours following the end of your surgery.  Even though you may feel normal, your reactions may be affected by the medication you have received.      Do not drink alcoholic beverages for 24 hours following surgery.       Slowly progress to your regular diet as you feel able. It's not unusual to feel nauseated and/or vomit after receiving anesthesia.  If you develop these symptoms, drink clear liquids (apple juice, ginger ale, broth, 7-up, etc. ) until you feel better.  If your nausea and vomiting persists for 24 hours, please notify your surgeon.        All narcotic pain medications, along with inactivity and anesthesia, can cause constipation. Drinking plenty of liquids and increasing fiber intake will help.      For any questions of a  medical nature, call your surgeon.      Do not make important decisions for 24 hours.      If you had general anesthesia, you may have a sore throat for a couple of days related to the breathing tube used during surgery.  You may use Cepacol lozenges to help with this discomfort.  If it worsens or if you develop a fever, contact your surgeon.       If you feel your pain is not well managed with the pain medications prescribed by your surgeon, please contact your surgeon's office to let them know so they can address your concerns.     Discharge Instructions Following Abdominoplasty    Once home, follow any instructions you are given. Your doctor will tell you when you can return to your normal routine. During your recovery:  Take any prescribed medications as directed.  Care for your incisions and the dressing (bandage) over them as instructed by your doctor.  Avoid strenuous activity and exercise as directed.   Wear compression garments if directed by your surgeon  Don t drive until you are no longer taking prescription pain medication and your doctor says it s okay.   Follow-Up       Follow up with your surgeon as directed. Let your doctor know if you have any questions or concerns.  Call the Doctor If You Have Any of the Following:  Fever of 100.4 F or higher (or as directed by your doctor)  Symptoms of infection at an incision site such as increased redness or swelling, warmth, worsening pain, or foul-smelling drainage  Pain not relieved by medications  Problems with nausea or vomiting  Any questions or problems      **If you have questions or concerns about your procedure,  call  at [phone]**    Christopher Xie Drain  Home Care Instructions    What is a Christopher Xie (ANTONIO) Drain?  This is a small tube that connects to a bulb.  Its gentle suction removes extra fluid from a surgical wound.  Your doctor will remove the tube when the amount of fluid decreases.  The color and amount of fluid varies.  Right  after surgery the fluid is bright red.  Over time, it changes to light pink and may become clear or the color of straw.    How should I care for my tube site?    Keep the skin around the tube dry.  Check with your doctor about how to shower.  You may need to cover the site with plastic when you shower.  Or, it may be okay to let the site get wet and put on a clean bandage after you shower.      If the bandage gets wet, you will need to change it.  How should I care for the bulb?    Keep the bulb compressed at all times except while you empty it.     Attach the bulb to your clothing with tape and a safety pin.    Try to empty the bulb at the same time every day.  Empty the bulb at least once a day, or when the bulb becomes half full.  If it becomes too full, there will not be enough suction.    To empty the bulb:    Wash your hands.    Open the bulb cap.    Drain the fluid from the bulb into the measuring cup.  If you have two drains, use two cups.      Clean the mouth of the bulb with an alcohol wipe if your nurse told you to.    Squeeze the bulb (fold it in half before you close the bulb cap) If it does not stay compressed, call your nurse or clinic.    Write the amount of drainage on the drainage record (see back page).  If you have two drains, write the amount for each bulb.    Flush the drainage down the toilet.  Rinse the measuring cup.    Wash your hands.    When should I call my doctor?   Call your doctor if:    You have a fever over 101 F (38.3 C), taken under the tongue.     The drainage increases or smells bad.    The skin around your tube has increased redness, swelling, warmth or pain.    You have pus or fluid leaking at the tube site.    Your stitches break.    You think the tube is not draining.    The tube falls out.    You have any problems or concerns.    Your drainage record:    Empty your bulb at least once per day or when 1/2 to 1/3 full.  Write down the date, time and amount of drainage in each  bulb.   Bring this record to each clinic visit.    Date Time Bulb 1: amount of  Drainage in (ml or cc) Bulb 2: amount of drainage (in ml or cc) Notes

## 2018-03-13 NOTE — ADDENDUM NOTE
Addendum  created 03/13/18 1436 by Chris Stover MD    Anesthesia Review and Sign - Ready for Procedure, Anesthesia Review and Sign - Signed

## 2018-03-13 NOTE — ANESTHESIA PREPROCEDURE EVALUATION
Anesthesia Evaluation     . Pt has had prior anesthetic. Type: General    No history of anesthetic complications          ROS/MED HX    ENT/Pulmonary:     (+)tobacco use, Current use , . .    Neurologic:       Cardiovascular:  - neg cardiovascular ROS       METS/Exercise Tolerance:     Hematologic:         Musculoskeletal:   (+) arthritis, , , -       GI/Hepatic:  - neg GI/hepatic ROS       Renal/Genitourinary:         Endo:         Psychiatric: Comment: ADHD        Infectious Disease:         Malignancy:         Other:                     Physical Exam  Normal systems: cardiovascular, pulmonary and dental    Airway   Mallampati: II  TM distance: >3 FB  Neck ROM: full    Dental     Cardiovascular   Rhythm and rate: regular and normal      Pulmonary    breath sounds clear to auscultation                    Anesthesia Plan      History & Physical Review  History and physical reviewed and following examination; no interval change.    ASA Status:  2 .    NPO Status:  > 8 hours    Plan for General and ETT with Propofol induction. Maintenance will be TIVA.    PONV prophylaxis:  Ondansetron (or other 5HT-3) and Dexamethasone or Solumedrol       Postoperative Care  Postoperative pain management:  IV analgesics and Oral pain medications.      Consents  Anesthetic plan, risks, benefits and alternatives discussed with:  Patient..                          .

## 2018-05-08 ENCOUNTER — TRANSFERRED RECORDS (OUTPATIENT)
Dept: HEALTH INFORMATION MANAGEMENT | Facility: CLINIC | Age: 53
End: 2018-05-08

## 2018-06-12 ENCOUNTER — TELEPHONE (OUTPATIENT)
Dept: FAMILY MEDICINE | Facility: OTHER | Age: 53
End: 2018-06-12

## 2018-06-12 NOTE — LETTER
[facility]  [address]   [facility] [address]  Phone: [phone]  June 12, 2018      Chelo Lopez  [address] [place] [address]      Dear Chelo,    We care about your health and have reviewed your health plan including your medical conditions, medications, and lab results.  Based on this review, it is recommended that you follow up regarding the following health topic(s):  -Breast Cancer Screening  -Colon Cancer Screening    We recommend you take the following action(s):  -schedule a MAMMOGRAM which is due. Please disregard this reminder if you have had this exam elsewhere within the last 1-2 years please let us know so we can update your records.  -schedule a COLONOSCOPY to look for colon cancer (due every 10 years or 5 years in higher risk situations.)  Colonoscopies can prevent 90-95% of colon cancer deaths.  Problem lesions can be removed before they ever become cancer.  If you do not wish to do a colonoscopy or cannot afford to do one at this time, there is another option called a Fecal Immunochemical Occult Blood Test (FIT) a take home stool sample kit.  It does not replace the colonoscopy for colorectal cancer screening, but it can detect hidden bleeding in the lower colon.  It does need to be repeated every year and if a positive result is obtained, you would be referred for a colonoscopy.  If you have completed either one of these tests at another facility, please have the records sent to our clinic for our records.     Please call us at the [facility] - [phone] (or use [employer]) to address the above recommendations.     Thank you for trusting [facility] and we appreciate the opportunity to serve you.  We look forward to supporting your healthcare needs in the future.    Healthy Regards,    Your Health Care Team  [facility]

## 2018-06-12 NOTE — TELEPHONE ENCOUNTER
Summary:    Patient is due/failing the following:   FIT and MAMMOGRAM    Action needed:   Complete a FIT test and schedule a mammogram     Type of outreach:    phone no answer and mailbox full. Reminder letter sent    Questions for provider review:    None                                                                                                                                    Tawana Madden       Chart routed to Care Team .        Panel Management Review      Patient has the following on her problem list: None      Composite cancer screening  Chart review shows that this patient is due/due soon for the following Mammogram and Fecal Colorectal (FIT)

## 2018-10-16 ENCOUNTER — TELEPHONE (OUTPATIENT)
Dept: FAMILY MEDICINE | Facility: OTHER | Age: 53
End: 2018-10-16

## 2018-10-16 NOTE — TELEPHONE ENCOUNTER
Summary:    Patient is due/failing the following:   FIT and MAMMOGRAM    Action needed:   Complete a FIT test and schedule a mammogram     Type of outreach:    phone no answer or voicemail. Reminder letter sent     Questions for provider review:    None                                                                                                                                    Tawana Madden       Chart routed to Care Team .    Panel Management Review      Patient has the following on her problem list: None      Composite cancer screening  Chart review shows that this patient is due/due soon for the following Mammogram and Fecal Colorectal (FIT)

## 2018-10-16 NOTE — LETTER
[facility]  [address]   [facility] [address]  Phone: [phone]  October 16, 2018      Chelo Lopez  [address] [place] [address]      Dear Chelo,    We care about your health and have reviewed your health plan including your medical conditions, medications, and lab results.  Based on this review, it is recommended that you follow up regarding the following health topic(s):  -Breast Cancer Screening  -Colon Cancer Screening    We recommend you take the following action(s):  -schedule a MAMMOGRAM which is due. Please disregard this reminder if you have had this exam elsewhere within the last 1-2 years please let us know so we can update your records.  -schedule a COLONOSCOPY to look for colon cancer (due every 10 years or 5 years in higher risk situations.)  Colonoscopies can prevent 90-95% of colon cancer deaths.  Problem lesions can be removed before they ever become cancer.  If you do not wish to do a colonoscopy or cannot afford to do one at this time, there is another option called a Fecal Immunochemical Occult Blood Test (FIT) a take home stool sample kit.  It does not replace the colonoscopy for colorectal cancer screening, but it can detect hidden bleeding in the lower colon.  It does need to be repeated every year and if a positive result is obtained, you would be referred for a colonoscopy.  If you have completed either one of these tests at another facility, please have the records sent to our clinic for our records.     Please call us at the [facility] - [phone] (or use [employer]) to address the above recommendations.     Thank you for trusting [facility] and we appreciate the opportunity to serve you.  We look forward to supporting your healthcare needs in the future.    Healthy Regards,    Your Health Care Team  [facility]

## 2018-12-22 ENCOUNTER — TELEPHONE (OUTPATIENT)
Dept: FAMILY MEDICINE | Facility: OTHER | Age: 53
End: 2018-12-22

## 2018-12-22 NOTE — TELEPHONE ENCOUNTER
12/22/2018    Attempt 3    Contacted patient in regards to scheduling VIP mammogram    Message VM FULL    Patient is also due for - Preventive Health Screening Colonoscopy    Comments: Clinic made prior attempts      Outreach   CC

## 2019-01-28 NOTE — PROGRESS NOTES
SUBJECTIVE:   CC: Chelo Lopez is an 53 year old woman who presents for preventive health visit.     Follow up Medication and Annual PE.     Physical   Annual:     Getting at least 3 servings of Calcium per day:  NO    Bi-annual eye exam:  Yes    Dental care twice a year:  NO    Sleep apnea or symptoms of sleep apnea:  None    Diet:  Regular (no restrictions)    Frequency of exercise:  2-3 days/week    Duration of exercise:  45-60 minutes    Taking medications regularly:  Yes    Medication side effects:  None    Additional concerns today:  Yes    PHQ-2 Total Score: 0        Today's PHQ-2 Score:   PHQ-2 ( 1999 [employer]) 2/4/2019   Q1: Little interest or pleasure in doing things 0   Q2: Feeling down, depressed or hopeless 0   PHQ-2 Score 0   Q1: Little interest or pleasure in doing things Not at all   Q2: Feeling down, depressed or hopeless Not at all   PHQ-2 Score 0       Abuse: Current or Past(Physical, Sexual or Emotional)- No  Do you feel safe in your environment? Yes    Social History     Tobacco Use     Smoking status: Current Some Day Smoker     Packs/day: 0.50     Types: Cigarettes     Smokeless tobacco: Never Used     Tobacco comment: attempting to quit   Substance Use Topics     Alcohol use: Yes     Comment: 4 weekly     Alcohol Use 2/4/2019   If you drink alcohol do you typically have greater than 3 drinks per day OR greater than 7 drinks per week? No       Reviewed orders with patient.  Reviewed health maintenance and updated orders accordingly - Yes  Labs reviewed in [facility]    Mammogram Screening: Patient over age 50, mutual decision to screen reflected in health maintenance.    Pertinent mammograms are reviewed under the imaging tab.  History of abnormal Pap smear: Status post hysterectomy.  PAP / HPV 2/9/2011 9/16/2008 8/14/2006   PAP OTHER-NIL EM>40 OTHER-NIL EM>40 NIL     Reviewed and updated as needed this visit by clinical staff  Tobacco  Allergies  Meds         Reviewed and updated as needed  this visit by Provider        Past Medical History:   Diagnosis Date     ADHD      Other disorder of menstruation and other abnormal bleeding from female genital tract      Tobacco use disorder       Past Surgical History:   Procedure Laterality Date     ARTHROPLASTY HIP  5/2/2012    Procedure:ARTHROPLASTY HIP; Right Total Hip Arthroplasty; Surgeon:DAGOBERTO CAMPBELL; [facility]     ARTHROPLASTY HIP Left 1/12/2015    Procedure: ARTHROPLASTY HIP;  Surgeon: Dagoberto Campbell MD;  [facility]     C NONSPECIFIC PROCEDURE  2001    cervical cone procedure?     C NONSPECIFIC PROCEDURE      rt salpingectomy (uncertain if oophorectomy)     COSMETIC ABDOMINOPLASTY N/A 3/13/2018    Procedure: COSMETIC ABDOMINOPLASTY;;  Surgeon: Jinny Flores MD;  Location: [facility]     COSMETIC LIPOSUCTION TRUNK N/A 3/13/2018    Procedure: COSMETIC LIPOSUCTION TRUNK;  COSMETIC ABDOMINOPLASTY, COSMETIC POSTERIOR TRUNK LIPOSUCTION.;  Surgeon: Jinny Flores MD;  Location: [facility]     COSMETIC MAMMOPLASTY AUGMENTATION BILATERAL      implants     CYSTOSCOPY  3/14/2011    CYSTOSCOPY performed by RICHARD MACKEY at  OR     HYSTERECTOMY  2011    menorrhagia     HYSTERECTOMY, PAP NO LONGER INDICATED       LAPAROSCOPIC ASSISTED HYSTERECTOMY VAGINAL  3/14/2011    LAPAROSCOPIC ASSISTED HYSTERECTOMY VAGINAL performed by RICHARD MACKEY at  OR     LAPAROSCOPY DIAGNOSTIC (GYN)  3/14/2011    LAPAROSCOPY DIAGNOSTIC (GYN) performed by RICHARD MACKEY at  OR     SURGICAL HISTORY OF -   1985    Laparotomy for the fallopian tube abscess        Review of Systems   Constitutional: Negative for chills and fever.   HENT: Negative for congestion, ear pain, hearing loss and sore throat.    Eyes: Negative for pain and visual disturbance.   Respiratory: Negative for cough and shortness of breath.    Cardiovascular: Negative for chest pain, palpitations and peripheral edema.   Gastrointestinal: Positive for heartburn. Negative for  "abdominal pain, constipation, diarrhea, hematochezia and nausea.   Breasts:  Negative for tenderness, breast mass and discharge.   Genitourinary: Negative for dysuria, frequency, genital sores, hematuria, pelvic pain, urgency, vaginal bleeding and vaginal discharge.   Musculoskeletal: Negative for arthralgias, joint swelling and myalgias.   Skin: Negative for rash.   Neurological: Negative for dizziness, weakness, headaches and paresthesias.   Psychiatric/Behavioral: Negative for mood changes. The patient is not nervous/anxious.       OBJECTIVE:   /72 (BP Location: Right arm, Patient Position: Chair, Cuff Size: Adult Regular)   Pulse 80   Temp 98.9  F (37.2  C) (Temporal)   Resp 16   Ht 1.676 m (5' 6\")   Wt 85.8 kg (189 lb 3.2 oz)   LMP 03/07/2011   SpO2 98%   Breastfeeding? No   BMI 30.54 kg/m    Physical Exam  GENERAL APPEARANCE: healthy, alert and no distress, obese  EYES: Eyes grossly normal to inspection, PERRL and conjunctivae and sclerae normal  HENT: ear canals and TM's normal, nose and mouth without ulcers or lesions, oropharynx clear and oral mucous membranes moist  NECK: no adenopathy, no asymmetry, masses, or scars and thyroid normal to palpation  RESP: lungs clear to auscultation - no rales, rhonchi or wheezes  BREAST: normal without masses, tenderness or nipple discharge and no palpable axillary masses or adenopathy  CV: regular rate and rhythm, normal S1 S2, no S3 or S4, no murmur, click or rub, no peripheral edema and peripheral pulses strong  ABDOMEN: soft, nontender, no hepatosplenomegaly, no masses and bowel sounds normal  MS: no musculoskeletal defects are noted and gait is age appropriate without ataxia  SKIN: no suspicious lesions or rashes to visible skin  NEURO: Normal strength and tone, sensory exam grossly normal, mentation intact and speech normal  PSYCH: mentation appears normal and affect normal/bright    No results found for this or any previous visit (from the past 24 " "hour(s)).    ASSESSMENT/PLAN:       ICD-10-CM    1. Encounter for routine adult health examination without abnormal findings Z00.00    2. Screen for colon cancer Z12.11    3. Visit for screening mammogram Z12.31 MA SCREENING DIGITAL BILAT - Future  (s+30)   4. Screening for HIV (human immunodeficiency virus) Z11.4    5. Need for prophylactic vaccination and inoculation against influenza Z23      Mammogram and colonoscopy orders to be scheduled when able.     ADHD  Discussed changing from adderall to ritalin, which she declines at this time    Smoking cessation   Prescribed wellbutrin     COUNSELING:  {FEMALE COUNSELING MESSAGES:007831::\"Reviewed preventive health counseling, as reflected in patient instructions\"}    BP Readings from Last 1 Encounters:   02/04/19 118/72     Estimated body mass index is 30.54 kg/m  as calculated from the following:    Height as of this encounter: 1.676 m (5' 6\").    Weight as of this encounter: 85.8 kg (189 lb 3.2 oz).      Weight management plan: Discussed healthy diet and exercise guidelines     reports that she has been smoking cigarettes.  She has been smoking about 0.50 packs per day. she has never used smokeless tobacco.  Tobacco Cessation Action Plan: {TOBACCO CESSTION ACTION PLAN:814491}    Counseling Resources:  ATP IV Guidelines  Pooled Cohorts Equation Calculator  Breast Cancer Risk Calculator  FRAX Risk Assessment  ICSI Preventive Guidelines  Dietary Guidelines for Americans, 2010  USDA's MyPlate  ASA Prophylaxis  Lung CA Screening    This document serves as a record of the services and decisions personally performed and made by More Lao MD. It was created on her behalf by Saul Rosas, a trained medical scribe. The creation of this document is based the provider's statements to the medical scribe.  Saul Rosas, February 4, 2019 2:03 PM     The information in this document, created by the medical scribe for me, accurately reflects the services I personally performed " and the decisions made by me. I have reviewed and approved this document for accuracy.   MD More Escalante MD, MD  [facility]

## 2019-02-04 ENCOUNTER — OFFICE VISIT (OUTPATIENT)
Dept: FAMILY MEDICINE | Facility: OTHER | Age: 54
End: 2019-02-04
Payer: COMMERCIAL

## 2019-02-04 ENCOUNTER — TELEPHONE (OUTPATIENT)
Dept: FAMILY MEDICINE | Facility: OTHER | Age: 54
End: 2019-02-04

## 2019-02-04 VITALS
SYSTOLIC BLOOD PRESSURE: 118 MMHG | TEMPERATURE: 98.9 F | RESPIRATION RATE: 16 BRPM | DIASTOLIC BLOOD PRESSURE: 72 MMHG | HEIGHT: 66 IN | WEIGHT: 189.2 LBS | HEART RATE: 80 BPM | OXYGEN SATURATION: 98 % | BODY MASS INDEX: 30.41 KG/M2

## 2019-02-04 DIAGNOSIS — Z23 NEED FOR PROPHYLACTIC VACCINATION AND INOCULATION AGAINST INFLUENZA: ICD-10-CM

## 2019-02-04 DIAGNOSIS — Z12.31 VISIT FOR SCREENING MAMMOGRAM: ICD-10-CM

## 2019-02-04 DIAGNOSIS — F90.2 ATTENTION DEFICIT HYPERACTIVITY DISORDER (ADHD), COMBINED TYPE: Primary | ICD-10-CM

## 2019-02-04 DIAGNOSIS — Z12.11 SCREEN FOR COLON CANCER: ICD-10-CM

## 2019-02-04 DIAGNOSIS — Z11.4 SCREENING FOR HIV (HUMAN IMMUNODEFICIENCY VIRUS): ICD-10-CM

## 2019-02-04 DIAGNOSIS — Z72.0 TOBACCO ABUSE DISORDER: ICD-10-CM

## 2019-02-04 PROCEDURE — 99214 OFFICE O/P EST MOD 30 MIN: CPT | Performed by: FAMILY MEDICINE

## 2019-02-04 PROCEDURE — 80306 DRUG TEST PRSMV INSTRMNT: CPT | Performed by: FAMILY MEDICINE

## 2019-02-04 RX ORDER — DEXTROAMPHETAMINE SACCHARATE, AMPHETAMINE ASPARTATE, DEXTROAMPHETAMINE SULFATE AND AMPHETAMINE SULFATE 5; 5; 5; 5 MG/1; MG/1; MG/1; MG/1
20 TABLET ORAL DAILY
Qty: 30 TABLET | Refills: 0 | Status: SHIPPED | OUTPATIENT
Start: 2019-02-04 | End: 2019-03-04 | Stop reason: ALTCHOICE

## 2019-02-04 RX ORDER — BUPROPION HYDROCHLORIDE 150 MG/1
150 TABLET ORAL EVERY MORNING
Qty: 90 TABLET | Refills: 1 | Status: SHIPPED | OUTPATIENT
Start: 2019-02-04 | End: 2020-04-02

## 2019-02-04 ASSESSMENT — ENCOUNTER SYMPTOMS
CONSTIPATION: 0
DIZZINESS: 0
FREQUENCY: 0
WEAKNESS: 0
NAUSEA: 0
MYALGIAS: 0
HEMATURIA: 0
CHILLS: 0
DIARRHEA: 0
SORE THROAT: 0
HEARTBURN: 1
PALPITATIONS: 0
ABDOMINAL PAIN: 0
PARESTHESIAS: 0
EYE PAIN: 0
FEVER: 0
HEMATOCHEZIA: 0
JOINT SWELLING: 0
DYSURIA: 0
ARTHRALGIAS: 0
SHORTNESS OF BREATH: 0
NERVOUS/ANXIOUS: 0
HEADACHES: 0
COUGH: 0
BREAST MASS: 0

## 2019-02-04 ASSESSMENT — MIFFLIN-ST. JEOR: SCORE: 1469.96

## 2019-02-04 NOTE — PROGRESS NOTES
SUBJECTIVE:   Chelo Lopez is a 53 year old female who presents to clinic today for the following health issues:    Physical   Annual:     Getting at least 3 servings of Calcium per day:  NO    Bi-annual eye exam:  Yes    Dental care twice a year:  NO    Sleep apnea or symptoms of sleep apnea:  None    Diet:  Regular (no restrictions)    Frequency of exercise:  2-3 days/week    Duration of exercise:  45-60 minutes    Taking medications regularly:  Yes    Medication side effects:  None    Additional concerns today:  Yes    PHQ-2 Total Score: 0    Problem list and histories reviewed & adjusted, as indicated.  Additional history: as documented    Patient Active Problem List   Diagnosis     Other disorder of menstruation and other abnormal bleeding from female genital tract     Tobacco use disorder     PMDD (premenstrual dysphoric disorder)     Menorrhagia     CARDIOVASCULAR SCREENING; LDL GOAL LESS THAN 160     ADHD (attention deficit hyperactivity disorder)     Osteoarthritis of hip     Past Surgical History:   Procedure Laterality Date     ARTHROPLASTY HIP  5/2/2012    Procedure:ARTHROPLASTY HIP; Right Total Hip Arthroplasty; Surgeon:DAGOBERTO CAMPBELL; [facility]     ARTHROPLASTY HIP Left 1/12/2015    Procedure: ARTHROPLASTY HIP;  Surgeon: Dagoberto Campbell MD;  [facility]     C NONSPECIFIC PROCEDURE  2001    cervical cone procedure?     C NONSPECIFIC PROCEDURE      rt salpingectomy (uncertain if oophorectomy)     COSMETIC ABDOMINOPLASTY N/A 3/13/2018    Procedure: COSMETIC ABDOMINOPLASTY;;  Surgeon: Jinny Flores MD;  Location: [facility]     COSMETIC LIPOSUCTION TRUNK N/A 3/13/2018    Procedure: COSMETIC LIPOSUCTION TRUNK;  COSMETIC ABDOMINOPLASTY, COSMETIC POSTERIOR TRUNK LIPOSUCTION.;  Surgeon: Jinny Flores MD;  Location: [facility]     COSMETIC MAMMOPLASTY AUGMENTATION BILATERAL      implants     CYSTOSCOPY  3/14/2011    CYSTOSCOPY performed by RICHARD MACKEY at  OR      "HYSTERECTOMY  2011    menorrhagia     HYSTERECTOMY, PAP NO LONGER INDICATED       LAPAROSCOPIC ASSISTED HYSTERECTOMY VAGINAL  3/14/2011    LAPAROSCOPIC ASSISTED HYSTERECTOMY VAGINAL performed by RICHARD MACKEY at  OR     LAPAROSCOPY DIAGNOSTIC (GYN)  3/14/2011    LAPAROSCOPY DIAGNOSTIC (GYN) performed by RICHARD MACKEY at  OR     SURGICAL HISTORY OF -   1985    Laparotomy for the fallopian tube abscess        Social History     Tobacco Use     Smoking status: Current Some Day Smoker     Packs/day: 0.50     Types: Cigarettes     Smokeless tobacco: Never Used     Tobacco comment: attempting to quit   Substance Use Topics     Alcohol use: Yes     Comment: 4 weekly     Family History   Problem Relation Age of Onset     Cerebrovascular Disease Father      Cancer Sister         skin     C.A.D. No family hx of      Breast Cancer No family hx of      Cancer - colorectal No family hx of            ROS:  Constitutional, HEENT, cardiovascular, pulmonary, GI, , musculoskeletal, neuro, skin, endocrine and psych systems are negative, except as in HPI or otherwise noted.     This document serves as a record of the services and decisions personally performed and made by More Lao MD. It was created on her behalf by Saul Rosas, a trained medical scribe. The creation of this document is based the provider's statements to the medical scribe.  Saul Rosas, February 4, 2019 2:31 PM    OBJECTIVE:                                                    /72 (BP Location: Right arm, Patient Position: Chair, Cuff Size: Adult Regular)   Pulse 80   Temp 98.9  F (37.2  C) (Temporal)   Resp 16   Ht 1.676 m (5' 6\")   Wt 85.8 kg (189 lb 3.2 oz)   LMP 03/07/2011   SpO2 98%   Breastfeeding? No   BMI 30.54 kg/m    Body mass index is 30.54 kg/m .   GENERAL: healthy, alert, well nourished, well hydrated, no distress, obese  SKIN: no suspicious lesions, no rashes to visible skin  PSYCH: Alert and oriented times 3; " speech- coherent , normal rate and volume; able to articulate logical thoughts, able to abstract reason, no tangential thoughts, no hallucinations or delusions, affect- normal    Diagnostic test results:  No results found for this or any previous visit (from the past 24 hour(s)).     ASSESSMENT/PLAN:                                                        ICD-10-CM    1. Attention deficit hyperactivity disorder (ADHD), combined type F90.2 amphetamine-dextroamphetamine (ADDERALL) 20 MG tablet     Drug Abuse Screen Panel 13, Urine (Pain Care Package)   2. Screen for colon cancer Z12.11 GASTROENTEROLOGY ADULT REF PROCEDURE ONLY   3. Visit for screening mammogram Z12.31 MA SCREENING DIGITAL BILAT - Future  (s+30)   4. Screening for HIV (human immunodeficiency virus) Z11.4    5. Need for prophylactic vaccination and inoculation against influenza Z23    6. Tobacco abuse disorder Z72.0 buPROPion (WELLBUTRIN XL) 150 MG 24 hr tablet     Declined physical today. She only wants med check. Will restart adderall today, so will need to f/u within 30 days, so can do it at this time if she prefers. Agrees and stated she was going to schedule at the end, but then got to be longer than she would like and had to go check on her dog and did not reschedule at .    ADHD:  Pt doing well on current medication and treatment plan. No change at this time. Discussed alternative control options such as Ritalin, which she declines at this time. Obtained [facility] drug screen and patient controlled substance contract. Will refill Adderall prescription today pending lab results. Return to clinic in 1 months.     Tobacco abuse disorder:  Prescription given for Wellbutrin today.    Health Maintenance:  Discussed colon cancer screening and breast cancer screening options. Orders placed for mammogram and referral given to GI regarding colonoscopy.    The information in this document, created by the medical scribe for me, accurately reflects the  services I personally performed and the decisions made by me. I have reviewed and approved this document for accuracy.   MD More Escalante MD, MD  [facility]

## 2019-02-04 NOTE — TELEPHONE ENCOUNTER
New restart ADHD meds, needs f/u in 3 weeks. Did not schedule today. If not rescheduled within the next week, will need to call her to help her schedule.  F/u ADHD, consider physical if she is agreeable.  More Lao MD

## 2019-02-04 NOTE — LETTER
[facility]  02/04/19    Patient: hCelo Lopez  YOB: 1965  Medical Record Number: [id number]  CSN: [id number]                                                                              Non-opioid Controlled Substance Agreement    I understand that my care provider has prescribed a controlled substance to help manage my condition(s). I am taking this medicine to help me function or work. I know this is strong medicine, and that it can cause serious side effects. Controlled substances can be sedating, addicting and may cause a dependency on the drug. They can affect my ability to drive or think, and cause depression. They need to be taken exactly as prescribed. Combining controlled substances with certain medicines or chemicals (such as cocaine, sedatives and tranquilizers, sleeping pills, meth) can be dangerous or even fatal. Also, if I stop controlled substances suddenly, I may have severe withdrawal symptoms.  If not helpful, I may be asked to stop them.    The risks, benefits, and side effects of these medicine(s) were explained to me. I agree that:    1. I will take part in other treatments as advised by my care team. This may be psychiatry or counseling, physical therapy, behavioral therapy, group treatment or a referral to a pain clinic. I will reduce or stop my medicine when my care team tells me to do so.  2. I will take my medicines as prescribed. I will not change the dose or schedule unless my care team tells me to. There will be no refills if I  run out early.   I may be contactedwithout warning and asked to complete a urine drug test or pill count at any time.   3. I will keep all my appointments, and understand this is part of the monitoring of controlled substances. My care team may require an office visit for EVERY controlled substance refill. If I miss appointments or don t follow instructions, my care team may stop my medicine.  4. I will not ask other providers to  prescribe controlled substances, and I will not accept controlled substances from other people. If I need another prescribed controlled substance for a new reason, I will tell my care team within 1 business day.  5. I will use one pharmacy to fill all of my controlled substance prescriptions, and it is up to me to make sure that I do not run out of my medicines on weekends or holidays. If my care team is willing to refill my controlled substance prescription without a visit, I must request refills only during office hours, refills may take up to 3 days to process, and it may take up to 5 to 7 days for my medicine to be mailed and ready at my pharmacy. Prescriptions will not be mailed anywhere except my pharmacy.    6. I am responsible for my prescriptions. If the medicine/prescription is lost or stolen, it will not be replaced. I also agree not to share controlled substance medicines with anyone.              [facility]  02/04/19  Patient:  Chelo Lopez  YOB: 1965  Medical Record Number: [id number]  CSN: [id number]    7. I agree to not use ANY illegal or recreational drugs. This includes marijuana, cocaine, bath salts or other drugs. I agree not to use alcohol unless my care team says I may. I agree to give urine samples whenever asked. If I don t give a urine sample, the care team may stop my medicine.    8. If I enroll in the [place] Medical Marijuana program, I will tell my care team. I will also sign an agreement to share my medical records with my care team.    9. I will bring in my list of medicines (or my medicine bottles) each time I come to the clinic.   10. I will tell my care team right away if I become pregnant or have a new medical problem treated outside of my regular clinic.  11. I understand that this medicine can affect my thinking and judgment. It may be unsafe for me to drive, use machinery and do dangerous tasks. I will not do any of these things until I know how  the medicine affects me. If my dose changes, I will wait to see how it affects me. I will contact my care team if I have concerns about medicine side effects.    I understand that if I do not follow any of the conditions above, my prescriptions or treatment may be stopped.      I agree that my provider, clinic care team, and pharmacy may work with any city, state or federal law enforcement agency that investigates the misuse, sale, or other diversion of my controlled medicine. I will allow my provider to discuss my care with or share a copy of this agreement with any other treating provider, pharmacy or emergency room where I receive care. I agree to give up (waive) any right of privacy or confidentiality with respect to these consents.   I have read this agreement and have asked questions about anything I did not understand.    ____________________________________________________    ____________  ________  Patient signature                                                         Date      Time    ____________________________________________________     ____________  ________  Witness                                                          Date      Time    ____________________________________________________  Provider signature

## 2019-02-05 LAB
AMPHETAMINES UR QL: NOT DETECTED NG/ML
BARBITURATES UR QL SCN: NOT DETECTED NG/ML
BENZODIAZ UR QL SCN: NOT DETECTED NG/ML
BUPRENORPHINE UR QL: NOT DETECTED NG/ML
CANNABINOIDS UR QL: NOT DETECTED NG/ML
COCAINE UR QL SCN: NOT DETECTED NG/ML
D-METHAMPHET UR QL: NOT DETECTED NG/ML
METHADONE UR QL SCN: NOT DETECTED NG/ML
OPIATES UR QL SCN: NOT DETECTED NG/ML
OXYCODONE UR QL SCN: NOT DETECTED NG/ML
PCP UR QL SCN: NOT DETECTED NG/ML
PROPOXYPH UR QL: NOT DETECTED NG/ML
TRICYCLICS UR QL SCN: NOT DETECTED NG/ML

## 2019-02-06 NOTE — RESULT ENCOUNTER NOTE
Chelo, your results were all normal.    Please let me know if you have any questions.    More Lao MD

## 2019-02-12 NOTE — TELEPHONE ENCOUNTER
Spoke to patient and scheduled physical/follow up on adhd medication. States that someone from [facility] called to schedule mammogram and she does not want to go there, she would rather go to [place].    Dixie Nobles MA

## 2019-02-27 NOTE — PROGRESS NOTES
"   SUBJECTIVE:   CC: Chelo Lopez is an 53 year old woman who presents for preventive health visit.     HPI  {Add if <65 person on Medicare  - Required Questions (Optional):879914}  {Outside tests to abstract? :453864}    {additional problems to add (Optional):467026}    Today's PHQ-2 Score:   PHQ-2 ( 1999 [employer]) 2/4/2019   Q1: Little interest or pleasure in doing things 0   Q2: Feeling down, depressed or hopeless 0   PHQ-2 Score 0   Q1: Little interest or pleasure in doing things Not at all   Q2: Feeling down, depressed or hopeless Not at all   PHQ-2 Score 0       Abuse: Current or Past(Physical, Sexual or Emotional)- {YES/NO/NA:756810}  Do you feel safe in your environment? {YES/NO/NA:815356}    Social History     Tobacco Use     Smoking status: Current Some Day Smoker     Packs/day: 0.50     Types: Cigarettes     Smokeless tobacco: Never Used     Tobacco comment: attempting to quit   Substance Use Topics     Alcohol use: Yes     Comment: 4 weekly     Alcohol Use 2/4/2019   If you drink alcohol do you typically have greater than 3 drinks per day OR greater than 7 drinks per week? No   {add AUDIT responses (Optional) (A score of 7 for adult men is an indication of hazardous drinking; a score of 8 or more is an indication of an alcohol use disorder.  A score of 7 or more for adult women is an indication of hazardous drinking or an alchohol use disorder):189689}    Reviewed orders with patient.  Reviewed health maintenance and updated orders accordingly - {Yes/No:608827::\"Yes\"}  {Chronicprobdata (Optional):092694}    {Mammo Decision Support (Optional):370181}    Pertinent mammograms are reviewed under the imaging tab.  History of abnormal Pap smear: {PAP HX:689081}  PAP / HPV 2/9/2011 9/16/2008 8/14/2006   PAP OTHER-NIL EM>40 OTHER-NIL EM>40 NIL     Reviewed and updated as needed this visit by clinical staff         Reviewed and updated as needed this visit by Provider        {HISTORY OPTIONS " "(Optional):265330}    Review of Systems  {FEMALE ROS (Optional):232078}     OBJECTIVE:   LMP 03/07/2011   Physical Exam  {Exam Choices (Optional):414575}    {Diagnostic Test Results (Optional):163077::\"Diagnostic Test Results:\",\"none \"}    ASSESSMENT/PLAN:   {Diag Picklist:857947}    COUNSELING:  {FEMALE COUNSELING MESSAGES:533114::\"Reviewed preventive health counseling, as reflected in patient instructions\"}    BP Readings from Last 1 Encounters:   02/04/19 118/72     Estimated body mass index is 30.54 kg/m  as calculated from the following:    Height as of 2/4/19: 1.676 m (5' 6\").    Weight as of 2/4/19: 85.8 kg (189 lb 3.2 oz).    {BP Counseling- Complete if BP >= 120/80  (Optional):120729}  {Weight Management Plan (ACO) Complete if BMI is abnormal-  Ages 18-64  BMI >24.9.  Age 65+ with BMI <23 or >30 (Optional):757225}     reports that she has been smoking cigarettes.  She has been smoking about 0.50 packs per day. she has never used smokeless tobacco.  {Tobacco Cessation -- Complete if patient is a smoker (Optional):773834}    Counseling Resources:  ATP IV Guidelines  Pooled Cohorts Equation Calculator  Breast Cancer Risk Calculator  FRAX Risk Assessment  ICSI Preventive Guidelines  Dietary Guidelines for Americans, 2010  USDA's MyPlate  ASA Prophylaxis  Lung CA Screening    More Lao MD, MD  [facility]  "

## 2019-03-04 ENCOUNTER — OFFICE VISIT (OUTPATIENT)
Dept: FAMILY MEDICINE | Facility: OTHER | Age: 54
End: 2019-03-04
Payer: COMMERCIAL

## 2019-03-04 VITALS
HEIGHT: 65 IN | WEIGHT: 184 LBS | OXYGEN SATURATION: 98 % | BODY MASS INDEX: 30.66 KG/M2 | DIASTOLIC BLOOD PRESSURE: 80 MMHG | RESPIRATION RATE: 16 BRPM | TEMPERATURE: 97.5 F | SYSTOLIC BLOOD PRESSURE: 128 MMHG | HEART RATE: 102 BPM

## 2019-03-04 DIAGNOSIS — F41.9 ANXIETY: ICD-10-CM

## 2019-03-04 DIAGNOSIS — Z23 NEED FOR PROPHYLACTIC VACCINATION AND INOCULATION AGAINST INFLUENZA: ICD-10-CM

## 2019-03-04 DIAGNOSIS — Z12.11 SCREEN FOR COLON CANCER: ICD-10-CM

## 2019-03-04 DIAGNOSIS — Z12.31 VISIT FOR SCREENING MAMMOGRAM: ICD-10-CM

## 2019-03-04 DIAGNOSIS — F90.2 ATTENTION DEFICIT HYPERACTIVITY DISORDER (ADHD), COMBINED TYPE: Primary | ICD-10-CM

## 2019-03-04 DIAGNOSIS — Z11.4 SCREENING FOR HIV (HUMAN IMMUNODEFICIENCY VIRUS): ICD-10-CM

## 2019-03-04 PROCEDURE — 99214 OFFICE O/P EST MOD 30 MIN: CPT | Performed by: FAMILY MEDICINE

## 2019-03-04 RX ORDER — METHYLPHENIDATE HYDROCHLORIDE 10 MG/1
10 TABLET ORAL 2 TIMES DAILY
Qty: 30 TABLET | Refills: 0 | Status: SHIPPED | OUTPATIENT
Start: 2019-03-04 | End: 2019-03-27

## 2019-03-04 RX ORDER — BUSPIRONE HYDROCHLORIDE 5 MG/1
5 TABLET ORAL 3 TIMES DAILY PRN
Qty: 90 TABLET | Refills: 0 | Status: SHIPPED | OUTPATIENT
Start: 2019-03-04 | End: 2020-04-02

## 2019-03-04 ASSESSMENT — MIFFLIN-ST. JEOR: SCORE: 1422.56

## 2019-03-04 ASSESSMENT — PAIN SCALES - GENERAL: PAINLEVEL: NO PAIN (0)

## 2019-03-04 NOTE — PROGRESS NOTES
SUBJECTIVE:  Chelo is here today to recheck ADHD/ADD.    Updates since last visit: stable    Routine for taking medicine, including time: 10 AM  Time medicine wears off: ~1AM  Issues at work: none mentioned  Issues at home: some projects left unfinished, takes awhile to get back to them.   Control of symptoms: She feels it has helped, but notes no specifics of metrics which indicate improvement.     Has been taking Wellbutrin for the past 2 weeks. She hasn't noticed any significant impact, but feels it has helped slightly. She has been smoking most days still.     Side effects:  Headaches: Yes left temporal headache. First headache in her memory almost daily for past couple weeks.   Stomach aches: No  Irritability/mood swings: No  Difficulties with sleep: No  Social withdrawal: No  Unusual movements/tics: No  Decreased appetite: No    Other concerns: Tried azalopram, and slept for 16 hours, she was wondering what the proper dose to avoid the sedation while providing anxiety relief.     Patient Active Problem List   Diagnosis     Other disorder of menstruation and other abnormal bleeding from female genital tract     Tobacco use disorder     PMDD (premenstrual dysphoric disorder)     Menorrhagia     CARDIOVASCULAR SCREENING; LDL GOAL LESS THAN 160     ADHD (attention deficit hyperactivity disorder)     Osteoarthritis of hip       Past Medical History:   Diagnosis Date     ADHD      Other disorder of menstruation and other abnormal bleeding from female genital tract      Tobacco use disorder        Past Surgical History:   Procedure Laterality Date     ARTHROPLASTY HIP  5/2/2012    Procedure:ARTHROPLASTY HIP; Right Total Hip Arthroplasty; Surgeon:DAGOBERTO CAMPBELL; [facility]     ARTHROPLASTY HIP Left 1/12/2015    Procedure: ARTHROPLASTY HIP;  Surgeon: Dagoberto Campbell MD;  Location:  OR      NONSPECIFIC PROCEDURE  2001    cervical cone procedure?     C NONSPECIFIC PROCEDURE      rt salpingectomy  "(uncertain if oophorectomy)     COSMETIC ABDOMINOPLASTY N/A 3/13/2018    Procedure: COSMETIC ABDOMINOPLASTY;;  Surgeon: Jinny Flores MD;  Location: [facility]     COSMETIC LIPOSUCTION TRUNK N/A 3/13/2018    Procedure: COSMETIC LIPOSUCTION TRUNK;  COSMETIC ABDOMINOPLASTY, COSMETIC POSTERIOR TRUNK LIPOSUCTION.;  Surgeon: Jinny Flores MD;  Location: [facility]     COSMETIC MAMMOPLASTY AUGMENTATION BILATERAL      implants     CYSTOSCOPY  3/14/2011    CYSTOSCOPY performed by RICHARD MACKEY at  OR     HYSTERECTOMY  2011    menorrhagia     HYSTERECTOMY, PAP NO LONGER INDICATED       LAPAROSCOPIC ASSISTED HYSTERECTOMY VAGINAL  3/14/2011    LAPAROSCOPIC ASSISTED HYSTERECTOMY VAGINAL performed by RICHARD MACKEY at  OR     LAPAROSCOPY DIAGNOSTIC (GYN)  3/14/2011    LAPAROSCOPY DIAGNOSTIC (GYN) performed by RICHARD MACKEY at  OR     SURGICAL HISTORY OF -   1985    Laparotomy for the fallopian tube abscess        Current Outpatient Medications   Medication     buPROPion (WELLBUTRIN XL) 150 MG 24 hr tablet     busPIRone (BUSPAR) 5 MG tablet     methylphenidate (RITALIN) 10 MG tablet     No current facility-administered medications for this visit.        OBJECTIVE:  /80   Pulse 102   Temp 97.5  F (36.4  C) (Temporal)   Resp 16   Ht 1.638 m (5' 4.5\")   Wt 83.5 kg (184 lb)   LMP 03/07/2011   SpO2 98%   BMI 31.10 kg/m    GENERAL: healthy, alert and no distress, obese  EYES: Eyes grossly normal to inspection, PERRL and conjunctivae and sclerae normal, EOMI  HENT: ear canals and TM's normal, nose and mouth without ulcers or lesions  NECK: mild left anterior adenopathy, no asymmetry, masses, or scars and thyroid normal to palpation  RESP: lungs clear to auscultation - no rales, rhonchi or wheezes  CV: regular rate and rhythm, normal S1 S2, no S3 or S4, no murmur, click or rub, no peripheral edema and peripheral pulses strong  SKIN: no suspicious lesions or rashes to visible skin  PSYCH: " mentation appears normal, affect normal/bright    ASSESSMENT:    ICD-10-CM    1. Attention deficit hyperactivity disorder (ADHD), combined type F90.2 methylphenidate (RITALIN) 10 MG tablet   2. Anxiety F41.9 busPIRone (BUSPAR) 5 MG tablet   3. Screen for colon cancer Z12.11    4. Visit for screening mammogram Z12.31    5. Screening for HIV (human immunodeficiency virus) Z11.4    6. Need for prophylactic vaccination and inoculation against influenza Z23      PLAN:  ADHD:  Chelo reports very little success with Adderall. Discussed Ritalin risks and benefits compared to Adderall, will switch to Ritalin to achieve better control. Return to clinic in 1 month for recheck.     Anxiety:  Buspar PRN prescribed for anxiety over upcoming cruise.     This document serves as a record of the services and decisions personally performed and made by More Lao MD. It was created on her behalf by Saul Rosas, a trained medical scribe. The creation of this document is based the provider's statements to the medical scribe.  Saul Rosas, March 4, 2019 3:19 PM    The information in this document, created by the medical scribe for me, accurately reflects the services I personally performed and the decisions made by me. I have reviewed and approved this document for accuracy.   MD More Escalante MD

## 2019-03-13 PROBLEM — F41.9 ANXIETY: Status: ACTIVE | Noted: 2019-03-13

## 2019-03-26 ENCOUNTER — MYC REFILL (OUTPATIENT)
Dept: FAMILY MEDICINE | Facility: OTHER | Age: 54
End: 2019-03-26

## 2019-03-26 DIAGNOSIS — F90.2 ATTENTION DEFICIT HYPERACTIVITY DISORDER (ADHD), COMBINED TYPE: ICD-10-CM

## 2019-03-26 RX ORDER — METHYLPHENIDATE HYDROCHLORIDE 10 MG/1
10 TABLET ORAL 2 TIMES DAILY
Qty: 30 TABLET | Refills: 0 | Status: CANCELLED | OUTPATIENT
Start: 2019-03-26

## 2019-03-26 NOTE — TELEPHONE ENCOUNTER
Requested Prescriptions   Pending Prescriptions Disp Refills     methylphenidate (RITALIN) 10 MG tablet 30 tablet 0     Sig: Take 1 tablet (10 mg) by mouth 2 times daily    There is no refill protocol information for this order        Last OV 03/04/2019  Last filled 03/04/2019    Per last ov was to schedule 1 month follow up.    Next 5 appointments (look out 90 days)    Apr 10, 2019  1:00 PM CDT  Office Visit with More Lao MD  [facility] [address] [place] [address] [address]  [facility] [id number] [phone]        Will route to AE for short term fill.    Hair Herzog, RN, BSN

## 2019-03-27 ENCOUNTER — OFFICE VISIT (OUTPATIENT)
Dept: FAMILY MEDICINE | Facility: OTHER | Age: 54
End: 2019-03-27
Payer: COMMERCIAL

## 2019-03-27 VITALS
BODY MASS INDEX: 30.35 KG/M2 | HEIGHT: 65 IN | TEMPERATURE: 98.8 F | OXYGEN SATURATION: 98 % | WEIGHT: 182.2 LBS | RESPIRATION RATE: 16 BRPM | HEART RATE: 84 BPM | SYSTOLIC BLOOD PRESSURE: 108 MMHG | DIASTOLIC BLOOD PRESSURE: 72 MMHG

## 2019-03-27 DIAGNOSIS — Z11.4 SCREENING FOR HIV (HUMAN IMMUNODEFICIENCY VIRUS): ICD-10-CM

## 2019-03-27 DIAGNOSIS — Z12.11 SCREEN FOR COLON CANCER: ICD-10-CM

## 2019-03-27 DIAGNOSIS — Z23 NEED FOR PROPHYLACTIC VACCINATION AND INOCULATION AGAINST INFLUENZA: ICD-10-CM

## 2019-03-27 DIAGNOSIS — Z12.31 VISIT FOR SCREENING MAMMOGRAM: ICD-10-CM

## 2019-03-27 DIAGNOSIS — F90.2 ATTENTION DEFICIT HYPERACTIVITY DISORDER (ADHD), COMBINED TYPE: ICD-10-CM

## 2019-03-27 DIAGNOSIS — T75.3XXA MOTION SICKNESS, INITIAL ENCOUNTER: Primary | ICD-10-CM

## 2019-03-27 PROCEDURE — 99214 OFFICE O/P EST MOD 30 MIN: CPT | Performed by: FAMILY MEDICINE

## 2019-03-27 RX ORDER — METHYLPHENIDATE HYDROCHLORIDE 10 MG/1
10 TABLET ORAL 2 TIMES DAILY
Qty: 60 TABLET | Refills: 0 | Status: SHIPPED | OUTPATIENT
Start: 2019-05-29 | End: 2020-04-02

## 2019-03-27 RX ORDER — SCOLOPAMINE TRANSDERMAL SYSTEM 1 MG/1
1 PATCH, EXTENDED RELEASE TRANSDERMAL
Qty: 3 PATCH | Refills: 1 | Status: SHIPPED | OUTPATIENT
Start: 2019-03-27 | End: 2020-04-02

## 2019-03-27 RX ORDER — METHYLPHENIDATE HYDROCHLORIDE 10 MG/1
10 TABLET ORAL 2 TIMES DAILY
Qty: 60 TABLET | Refills: 0 | Status: SHIPPED | OUTPATIENT
Start: 2019-04-29 | End: 2019-03-27

## 2019-03-27 RX ORDER — METHYLPHENIDATE HYDROCHLORIDE 10 MG/1
10 TABLET ORAL 2 TIMES DAILY
Qty: 60 TABLET | Refills: 0 | Status: SHIPPED | OUTPATIENT
Start: 2019-03-27 | End: 2019-03-27

## 2019-03-27 ASSESSMENT — ANXIETY QUESTIONNAIRES
1. FEELING NERVOUS, ANXIOUS, OR ON EDGE: MORE THAN HALF THE DAYS
2. NOT BEING ABLE TO STOP OR CONTROL WORRYING: SEVERAL DAYS
IF YOU CHECKED OFF ANY PROBLEMS ON THIS QUESTIONNAIRE, HOW DIFFICULT HAVE THESE PROBLEMS MADE IT FOR YOU TO DO YOUR WORK, TAKE CARE OF THINGS AT HOME, OR GET ALONG WITH OTHER PEOPLE: NOT DIFFICULT AT ALL
6. BECOMING EASILY ANNOYED OR IRRITABLE: NOT AT ALL
7. FEELING AFRAID AS IF SOMETHING AWFUL MIGHT HAPPEN: SEVERAL DAYS
GAD7 TOTAL SCORE: 5
5. BEING SO RESTLESS THAT IT IS HARD TO SIT STILL: SEVERAL DAYS
3. WORRYING TOO MUCH ABOUT DIFFERENT THINGS: NOT AT ALL

## 2019-03-27 ASSESSMENT — MIFFLIN-ST. JEOR: SCORE: 1417.33

## 2019-03-27 ASSESSMENT — PATIENT HEALTH QUESTIONNAIRE - PHQ9
SUM OF ALL RESPONSES TO PHQ QUESTIONS 1-9: 1
5. POOR APPETITE OR OVEREATING: NOT AT ALL

## 2019-03-27 NOTE — TELEPHONE ENCOUNTER
Next 5 appointments (look out 90 days)    Mar 27, 2019  3:00 PM CDT  Office Visit with More Lao MD  [facility] [address] [address]  [phone]   Apr 10, 2019  1:00 PM CDT  Office Visit with More Lao MD  [facility] [address] [address]  [phone]

## 2019-03-27 NOTE — TELEPHONE ENCOUNTER
Patient returned call and was given information below, we made an appointment for the patient for 03/27/2019.    Thank you Vidhya

## 2019-03-27 NOTE — TELEPHONE ENCOUNTER
ADHD f/u has to be within a month. Please see if there is any way we can work her in sooner as she was scheduled more than a month out. I am out next week, so will have to double book, use dr only, or c3po time.  Can update my mistake on the Ritalin number of tabs, but requirements for a change in meds are to be seen within 3-4 weeks. Already 23 days out.  More Lao MD

## 2019-03-27 NOTE — PROGRESS NOTES
SUBJECTIVE:  Chelo is a 54 year old female who presents to clinic today with concern for ADHD.    ***    Primary symptoms at home include: ***    Primary symptoms at school include: ***    Grades: ***  Concern for learning disability: ***  New stressors at home: ***    ROS: No seizures, no snoring, no sleep apnea, sleeps *** hours per night, seemed well rested in the morning, no fainting, no chest pain, no palpitations    Past Medical History:   Diagnosis Date     ADHD      Other disorder of menstruation and other abnormal bleeding from female genital tract      Tobacco use disorder        Past Surgical History:   Procedure Laterality Date     ARTHROPLASTY HIP  5/2/2012    Procedure:ARTHROPLASTY HIP; Right Total Hip Arthroplasty; Surgeon:DAGOBERTO CAMPBELL; Location: OR     ARTHROPLASTY HIP Left 1/12/2015    Procedure: ARTHROPLASTY HIP;  Surgeon: Dagoberto Campbell MD;  Location:  OR     C NONSPECIFIC PROCEDURE  2001    cervical cone procedure?     C NONSPECIFIC PROCEDURE      rt salpingectomy (uncertain if oophorectomy)     COSMETIC ABDOMINOPLASTY N/A 3/13/2018    Procedure: COSMETIC ABDOMINOPLASTY;;  Surgeon: Jinny Flores MD;  Location: [facility]     COSMETIC LIPOSUCTION TRUNK N/A 3/13/2018    Procedure: COSMETIC LIPOSUCTION TRUNK;  COSMETIC ABDOMINOPLASTY, COSMETIC POSTERIOR TRUNK LIPOSUCTION.;  Surgeon: Jinny Flores MD;  Location: [facility]     COSMETIC MAMMOPLASTY AUGMENTATION BILATERAL      implants     CYSTOSCOPY  3/14/2011    CYSTOSCOPY performed by RICHARD MACKEY at  OR     HYSTERECTOMY  2011    menorrhagia     HYSTERECTOMY, PAP NO LONGER INDICATED       LAPAROSCOPIC ASSISTED HYSTERECTOMY VAGINAL  3/14/2011    LAPAROSCOPIC ASSISTED HYSTERECTOMY VAGINAL performed by RICHARD MACKEY at  OR     LAPAROSCOPY DIAGNOSTIC (GYN)  3/14/2011    LAPAROSCOPY DIAGNOSTIC (GYN) performed by RICHARD MACKEY at  OR     SURGICAL HISTORY OF -   1985    Laparotomy for the fallopian tube  abscess        Current Outpatient Medications   Medication     buPROPion (WELLBUTRIN XL) 150 MG 24 hr tablet     busPIRone (BUSPAR) 5 MG tablet     methylphenidate (RITALIN) 10 MG tablet     No current facility-administered medications for this visit.        FH:  There is *** history of ADHD.  There is *** history of sudden cardiac death, arrhythmias or tics.    SH:  Chelo lives with ***. Chelo attends *** School in the *** grade.       OBJECTIVE:  [facility] 03/07/2011       ***      ASSESSMENT:  ***    More Lao MD

## 2019-03-27 NOTE — PROGRESS NOTES
SUBJECTIVE:   Chelo Lopez is a 54 year old female who presents to clinic today for the following health issues:    Chelo is here today to recheck ADHD/ADD.  Also requesting something for nausea as she is going on a cruise on Saturday.     Updates since last visit: Patient here for a med check and follow up on ADHD. States that she thinks she is doing well but is confused on the dosing as she is to take one tablet BID but was only given 30 tablets.    Routine for taking medicine, including time: She takes it around 10-11 AM and lasts until 2 PM. She takes the second dose either at that time, or after that if she is working late.   Time medicine wears off: after 4 hours Issues at work  Control of symptoms: Good control. Medication wearing off too soon.     Side effects:  Headaches: No  Stomach aches: No  Irritability/mood swings: No  Difficulties with sleep: No  Social withdrawal: No  Unusual movements/tics: No  Decreased appetite: No    Other concerns: None      Patient Active Problem List   Diagnosis     Other disorder of menstruation and other abnormal bleeding from female genital tract     Tobacco use disorder     PMDD (premenstrual dysphoric disorder)     Menorrhagia     CARDIOVASCULAR SCREENING; LDL GOAL LESS THAN 160     ADHD (attention deficit hyperactivity disorder)     Osteoarthritis of hip     Anxiety       Past Medical History:   Diagnosis Date     ADHD      Other disorder of menstruation and other abnormal bleeding from female genital tract      Tobacco use disorder        Past Surgical History:   Procedure Laterality Date     ARTHROPLASTY HIP  5/2/2012    Procedure:ARTHROPLASTY HIP; Right Total Hip Arthroplasty; Surgeon:DAGOBERTO CAMPBELL; [facility]     ARTHROPLASTY HIP Left 1/12/2015    Procedure: ARTHROPLASTY HIP;  Surgeon: Dagoberto Campbell MD;  Location:  OR      NONSPECIFIC PROCEDURE  2001    cervical cone procedure?     C NONSPECIFIC PROCEDURE      rt salpingectomy (uncertain if  "oophorectomy)     COSMETIC ABDOMINOPLASTY N/A 3/13/2018    Procedure: COSMETIC ABDOMINOPLASTY;;  Surgeon: Jinny Flores MD;  Location[facility]     COSMETIC LIPOSUCTION TRUNK N/A 3/13/2018    Procedure: COSMETIC LIPOSUCTION TRUNK;  COSMETIC ABDOMINOPLASTY, COSMETIC POSTERIOR TRUNK LIPOSUCTION.;  Surgeon: Jinny Flores MD;  Location: [facility]     COSMETIC MAMMOPLASTY AUGMENTATION BILATERAL      implants     CYSTOSCOPY  3/14/2011    CYSTOSCOPY performed by RICHARD MACKEY at  OR     HYSTERECTOMY  2011    menorrhagia     HYSTERECTOMY, PAP NO LONGER INDICATED       LAPAROSCOPIC ASSISTED HYSTERECTOMY VAGINAL  3/14/2011    LAPAROSCOPIC ASSISTED HYSTERECTOMY VAGINAL performed by RICHARD MACKEY at  OR     LAPAROSCOPY DIAGNOSTIC (GYN)  3/14/2011    LAPAROSCOPY DIAGNOSTIC (GYN) performed by RICHARD MACKEY at  OR     SURGICAL HISTORY OF -   1985    Laparotomy for the fallopian tube abscess        Current Outpatient Medications   Medication     buPROPion (WELLBUTRIN XL) 150 MG 24 hr tablet     [START ON 5/29/2019] methylphenidate (RITALIN) 10 MG tablet     scopolamine (TRANSDERM) 1 MG/3DAYS 72 hr patch     busPIRone (BUSPAR) 5 MG tablet     No current facility-administered medications for this visit.        OBJECTIVE:  /72 (BP Location: Right arm, Patient Position: Chair, Cuff Size: Adult Regular)   Pulse 84   Temp 98.8  F (37.1  C) (Temporal)   Resp 16   Ht 1.651 m (5' 5\")   Wt 82.6 kg (182 lb 3.2 oz)   LMP 03/07/2011   SpO2 98%   Breastfeeding? No   BMI 30.32 kg/m    Gen: alert, awake, NAD  Lungs: CTA russ  Heart: RR without murmur  Psych: PSYCH: mentation appears normal., affect normal/bright    ASSESSMENT:    ICD-10-CM    1. Motion sickness, initial encounter T75.3XXA scopolamine (TRANSDERM) 1 MG/3DAYS 72 hr patch   2. Attention deficit hyperactivity disorder (ADHD), combined type F90.2 methylphenidate (RITALIN) 10 MG tablet     DISCONTINUED: methylphenidate (RITALIN) 10 MG " tablet     DISCONTINUED: methylphenidate (RITALIN) 10 MG tablet   3. Screen for colon cancer Z12.11    4. Visit for screening mammogram Z12.31    5. Screening for HIV (human immunodeficiency virus) Z11.4    6. Need for prophylactic vaccination and inoculation against influenza Z23      Seasickness: Discussed OTC options for dealing with seasickness, including copper bands, odiila, dramamine, and prescription treatments like the scopolamine patch. She would like a prescription for the patches.    Colonoscopy: She has been having difficulty scheduling, [facility] was supposed to call, but they did not. The order was reentered for [facility] to call her to schedule her colonoscopy.     ADHD: She has been doing well with the twice daily dosing, does not desire any changes at this time. 3 months of refills given.     Patient Instructions   [phone] for [facility] -- transfer for colonoscopy    The information in this document, created by the medical scribe for me, accurately reflects the services I personally performed and the decisions made by me. I have reviewed and approved this document for accuracy.     More Lao MD

## 2019-03-28 ASSESSMENT — ANXIETY QUESTIONNAIRES: GAD7 TOTAL SCORE: 5

## 2019-09-28 ENCOUNTER — HEALTH MAINTENANCE LETTER (OUTPATIENT)
Age: 54
End: 2019-09-28

## 2019-10-16 ENCOUNTER — TELEPHONE (OUTPATIENT)
Dept: FAMILY MEDICINE | Facility: OTHER | Age: 54
End: 2019-10-16

## 2019-10-16 NOTE — TELEPHONE ENCOUNTER
Summary:    Patient is due/failing the following:   FIT, MAMMOGRAM and PHYSICAL    Action needed:   Patient needs office visit for Physical. and schedule a mammogram and complete a FIT test    Type of outreach:    phone person answered but did not respond    Reminder letter sent  Questions for provider review:    None                                                                                                                                    Tawana Gillespie CMA       Chart routed to Care Team .        Panel Management Review      Patient has the following on her problem list: None      Composite cancer screening  Chart review shows that this patient is due/due soon for the following Mammogram and Fecal Colorectal (FIT)

## 2019-10-16 NOTE — LETTER
[facility]  [address]   [facility] [address]  Phone: [phone]  October 16, 2019      Chelo Lopez  [address] [place] [address]      Dear Chelo,    We care about your health and have reviewed your health plan including your medical conditions, medications, and lab results.  Based on this review, it is recommended that you follow up regarding the following health topic(s):  -Breast Cancer Screening  -Colon Cancer Screening  -Wellness (Physical) Visit     We recommend you take the following action(s):  -schedule a MAMMOGRAM which is due. Please disregard this reminder if you have had this exam elsewhere within the last 1-2 years please let us know so we can update your records.  -schedule a COLONOSCOPY to look for colon cancer (due every 10 years or 5 years in higher risk situations.)  Colonoscopies can prevent 90-95% of colon cancer deaths.  Problem lesions can be removed before they ever become cancer.  If you do not wish to do a colonoscopy or cannot afford to do one at this time, there is another option called a Fecal Immunochemical Occult Blood Test (FIT) a take home stool sample kit.  It does not replace the colonoscopy for colorectal cancer screening, but it can detect hidden bleeding in the lower colon.  It does need to be repeated every year and if a positive result is obtained, you would be referred for a colonoscopy.  If you have completed either one of these tests at another facility, please have the records sent to our clinic for our records.     Please call us at the [facility] [place] - [phone] (or use SupportLocal) to address the above recommendations.     Thank you for trusting [facility] and we appreciate the opportunity to serve you.  We look forward to supporting your healthcare needs in the future.    Healthy Regards,    Your Health Care Team  [facility]

## 2020-01-14 ENCOUNTER — TELEPHONE (OUTPATIENT)
Dept: FAMILY MEDICINE | Facility: OTHER | Age: 55
End: 2020-01-14

## 2020-01-14 NOTE — TELEPHONE ENCOUNTER
Summary:    Patient is due/failing the following:   FIT, MAMMOGRAM, PAP and PHYSICAL    Action needed:   Patient needs office visit for Physical/PAP. and schedule a mammogram and schedule a colonoscopy or complete a FIT test    Type of outreach:    Sent [employer] message.    Questions for provider review:    None                                                                                                                                    Tawana Gillespie CMA       Chart routed to Care Team .

## 2020-04-02 ENCOUNTER — VIRTUAL VISIT (OUTPATIENT)
Dept: FAMILY MEDICINE | Facility: OTHER | Age: 55
End: 2020-04-02
Payer: COMMERCIAL

## 2020-04-02 ENCOUNTER — TELEPHONE (OUTPATIENT)
Dept: FAMILY MEDICINE | Facility: OTHER | Age: 55
End: 2020-04-02

## 2020-04-02 DIAGNOSIS — F41.9 ANXIETY: ICD-10-CM

## 2020-04-02 DIAGNOSIS — Z72.0 TOBACCO ABUSE DISORDER: ICD-10-CM

## 2020-04-02 DIAGNOSIS — F90.0 ATTENTION DEFICIT HYPERACTIVITY DISORDER (ADHD), PREDOMINANTLY INATTENTIVE TYPE: Primary | ICD-10-CM

## 2020-04-02 PROCEDURE — 99442 ZZC PHYSICIAN TELEPHONE EVALUATION 11-20 MIN: CPT | Performed by: FAMILY MEDICINE

## 2020-04-02 RX ORDER — BUPROPION HYDROCHLORIDE 150 MG/1
150 TABLET ORAL EVERY MORNING
Qty: 90 TABLET | Refills: 1 | Status: SHIPPED | OUTPATIENT
Start: 2020-04-02 | End: 2020-04-30

## 2020-04-02 RX ORDER — DEXTROAMPHETAMINE SACCHARATE, AMPHETAMINE ASPARTATE MONOHYDRATE, DEXTROAMPHETAMINE SULFATE AND AMPHETAMINE SULFATE 5; 5; 5; 5 MG/1; MG/1; MG/1; MG/1
20 CAPSULE, EXTENDED RELEASE ORAL DAILY
Qty: 30 CAPSULE | Refills: 0 | Status: SHIPPED | OUTPATIENT
Start: 2020-04-02 | End: 2020-04-30

## 2020-04-02 ASSESSMENT — ANXIETY QUESTIONNAIRES
2. NOT BEING ABLE TO STOP OR CONTROL WORRYING: SEVERAL DAYS
5. BEING SO RESTLESS THAT IT IS HARD TO SIT STILL: NOT AT ALL
GAD7 TOTAL SCORE: 4
IF YOU CHECKED OFF ANY PROBLEMS ON THIS QUESTIONNAIRE, HOW DIFFICULT HAVE THESE PROBLEMS MADE IT FOR YOU TO DO YOUR WORK, TAKE CARE OF THINGS AT HOME, OR GET ALONG WITH OTHER PEOPLE: NOT DIFFICULT AT ALL
1. FEELING NERVOUS, ANXIOUS, OR ON EDGE: SEVERAL DAYS
3. WORRYING TOO MUCH ABOUT DIFFERENT THINGS: SEVERAL DAYS
6. BECOMING EASILY ANNOYED OR IRRITABLE: NOT AT ALL
7. FEELING AFRAID AS IF SOMETHING AWFUL MIGHT HAPPEN: SEVERAL DAYS

## 2020-04-02 ASSESSMENT — PATIENT HEALTH QUESTIONNAIRE - PHQ9
SUM OF ALL RESPONSES TO PHQ QUESTIONS 1-9: 5
5. POOR APPETITE OR OVEREATING: NOT AT ALL

## 2020-04-02 NOTE — TELEPHONE ENCOUNTER
Patient needs appt in 3-4 weeks for ADHD f/u. I don't have schedules out that far yet. Will have to see if I am anywhere during that time or if someone else needs to be offered.  Please delay appt until Monday and then schedule based on the finalized schedules.  More Lao MD

## 2020-04-02 NOTE — PROGRESS NOTES
"Subjective     Chelo Lopez is a 55 year old female who is being evaluated via a billable telephone visit.      The patient has been notified of following:     \"This telephone visit will be conducted via a call between you and your physician/provider. We have found that certain health care needs can be provided without the need for a physical exam.  This service lets us provide the care you need with a short phone conversation.  If a prescription is necessary we can send it directly to your pharmacy.  If lab work is needed we can place an order for that and you can then stop by our lab to have the test done at a later time.    If during the course of the call the physician/provider feels a telephone visit is not appropriate, you will not be charged for this service.\"     Patient has given verbal consent for Telephone visit?  Yes    Chelo Lopez complains of   Chief Complaint   Patient presents with     Medication Request       ALLERGIES  Bees and No known allergies    Depression and Anxiety Follow-Up    How are you doing with your depression since your last visit? \"it's a trying time right now\"    How are you doing with your anxiety since your last visit?  \"it's a trying time right now\"    Are you having other symptoms that might be associated with depression or anxiety? Yes:  a little bit of depression    Have you had a significant life event? Job Concerns - due to covid19     Do you have any concerns with your use of alcohol or other drugs? No    Social History     Tobacco Use     Smoking status: Current Some Day Smoker     Packs/day: 0.50     Types: Cigarettes     Smokeless tobacco: Never Used     Tobacco comment: attempting to quit   Substance Use Topics     Alcohol use: Yes     Comment: 4 weekly     Drug use: No     PHQ 3/27/2019 4/2/2020   PHQ-9 Total Score 1 5   Q9: Thoughts of better off dead/self-harm past 2 weeks Not at all Not at all     EDMUNDO-7 SCORE 3/27/2019 4/2/2020   Total Score 5 4     Last " PHQ-9 4/2/2020   1.  Little interest or pleasure in doing things 1   2.  Feeling down, depressed, or hopeless 1   3.  Trouble falling or staying asleep, or sleeping too much 0   4.  Feeling tired or having little energy 1   5.  Poor appetite or overeating 0   6.  Feeling bad about yourself 0   7.  Trouble concentrating 2   8.  Moving slowly or restless 0   Q9: Thoughts of better off dead/self-harm past 2 weeks 0   PHQ-9 Total Score 5   Difficulty at work, home, or with people Not difficult at all     EDMUNDO-7  4/2/2020   1. Feeling nervous, anxious, or on edge 1   2. Not being able to stop or control worrying 1   3. Worrying too much about different things 1   4. Trouble relaxing 0   5. Being so restless that it is hard to sit still 0   6. Becoming easily annoyed or irritable 0   7. Feeling afraid, as if something awful might happen 1   EDMUNDO-7 Total Score 4   If you checked any problems, how difficult have they made it for you to do your work, take care of things at home, or get along with other people? Not difficult at all     Took ritalin last year, liked the adderall better. But was taking only as needed this last year on high focus days. Would like to accomplish something while unemployed.   Also would like to start taking the wellbutrin again as well. Has taken wellbutrin by itself lately as she found it. Has had it a few weeks. She hasn't really noticed a lot of change in focus. Hasn't been doing well at quitting smoking at this time.  It is a bother to get in for the ADHD and doesn't really like the maintenance.       Suicide Assessment Five-step Evaluation and Treatment (SAFE-T)    Chelo is here today to recheck ADHD/ADD.    Updates since last visit: wants to switch to adderall, works better    Routine for taking medicine, including time: it varies - when she gets up  Time medicine wears off: it depends on when she takes it   Issues at school/work: no  Issues at home: no  Control of symptoms: not as much as  adderall     Side effects:  Headaches: No  Stomach aches: No  Irritability/mood swings: No  Difficulties with sleep: No  Social withdrawal: No  Unusual movements/tics: No  Decreased appetite: No           -------------------------------------    Patient Active Problem List   Diagnosis     Other disorder of menstruation and other abnormal bleeding from female genital tract     Tobacco use disorder     PMDD (premenstrual dysphoric disorder)     Menorrhagia     CARDIOVASCULAR SCREENING; LDL GOAL LESS THAN 160     ADHD (attention deficit hyperactivity disorder)     Osteoarthritis of hip     Anxiety     Past Surgical History:   Procedure Laterality Date     ARTHROPLASTY HIP  5/2/2012    Procedure:ARTHROPLASTY HIP; Right Total Hip Arthroplasty; Surgeon:DAGBOERTO CAMPBELL; Location: OR     ARTHROPLASTY HIP Left 1/12/2015    Procedure: ARTHROPLASTY HIP;  Surgeon: Dagoberto Campbell MD;  Location:  OR     C NONSPECIFIC PROCEDURE  2001    cervical cone procedure?     C NONSPECIFIC PROCEDURE      rt salpingectomy (uncertain if oophorectomy)     COSMETIC ABDOMINOPLASTY N/A 3/13/2018    Procedure: COSMETIC ABDOMINOPLASTY;;  Surgeon: Jinny Flores MD;  Location: [facility]     COSMETIC LIPOSUCTION TRUNK N/A 3/13/2018    Procedure: COSMETIC LIPOSUCTION TRUNK;  COSMETIC ABDOMINOPLASTY, COSMETIC POSTERIOR TRUNK LIPOSUCTION.;  Surgeon: Jinny Flores MD;  Location: [facility]     COSMETIC MAMMOPLASTY AUGMENTATION BILATERAL      implants     CYSTOSCOPY  3/14/2011    CYSTOSCOPY performed by RICHARD MACKEY at  OR     HYSTERECTOMY  2011    menorrhagia     HYSTERECTOMY, PAP NO LONGER INDICATED       LAPAROSCOPIC ASSISTED HYSTERECTOMY VAGINAL  3/14/2011    LAPAROSCOPIC ASSISTED HYSTERECTOMY VAGINAL performed by RICHARD MACKEY at  OR     LAPAROSCOPY DIAGNOSTIC (GYN)  3/14/2011    LAPAROSCOPY DIAGNOSTIC (GYN) performed by RICHARD MACKEY at  OR     SURGICAL HISTORY OF -   1985    Laparotomy for the fallopian  tube abscess        Social History     Tobacco Use     Smoking status: Current Some Day Smoker     Packs/day: 0.50     Types: Cigarettes     Smokeless tobacco: Never Used     Tobacco comment: attempting to quit   Substance Use Topics     Alcohol use: Yes     Comment: 4 weekly     Family History   Problem Relation Age of Onset     Cerebrovascular Disease Father      Cancer Sister         skin     C.A.D. No family hx of      Breast Cancer No family hx of      Cancer - colorectal No family hx of            Reviewed and updated as needed this visit by Provider  Tobacco  Allergies  Meds  Problems  Med Hx  Surg Hx  Fam Hx         Review of Systems   ROS COMP: Constitutional, HEENT, cardiovascular, pulmonary, GI, , musculoskeletal, neuro, skin, endocrine and psych systems are negative, except as otherwise noted.       Objective   Reported vitals:  LMP 03/07/2011    Bp: 120/80 HR: 63  healthy, alert and no distress  Psych: Alert and oriented times 3; coherent speech, normal   rate and volume, able to articulate logical thoughts, able   to abstract reason, no tangential thoughts, no hallucinations   or delusions  Her affect is normal             Assessment/Plan:  1. Attention deficit hyperactivity disorder (ADHD), predominantly inattentive type    2. Tobacco abuse disorder    3. Anxiety       Really appears to be on track and conversing well. Discussed her use of wellbutrin as possible helping as she was reporting more symptoms prior. But she is not necessarily convinced, does want adderall and renewed this. Plan recheck in 1 month to see how it changed her focus.  Is anxiety as well, but declines buspar, she never took it the last time either.    Return in about 4 weeks (around 4/30/2020) for adhd.      Phone call duration:  13 minutes    More Lao MD, MD

## 2020-04-03 ASSESSMENT — ANXIETY QUESTIONNAIRES: GAD7 TOTAL SCORE: 4

## 2020-04-06 NOTE — TELEPHONE ENCOUNTER
Patient scheduled.    Next 5 appointments (look out 90 days)    Apr 30, 2020  2:30 PM CDT  Telephone Visit with More Lao MD  [facility] [address] [address]  [phone]          Tawana Gillespie [facility]

## 2020-04-23 NOTE — PROGRESS NOTES
"Chelo Lopez is a 55 year old female who is being evaluated via a billable telephone visit.      The patient has been notified of following:     \"This telephone visit will be conducted via a call between you and your physician/provider. We have found that certain health care needs can be provided without the need for a physical exam.  This service lets us provide the care you need with a short phone conversation.  If a prescription is necessary we can send it directly to your pharmacy.  If lab work is needed we can place an order for that and you can then stop by our lab to have the test done at a later time.    Telephone visits are billed at different rates depending on your insurance coverage. During this emergency period, for some insurers they may be billed the same as an in-person visit.  Please reach out to your insurance provider with any questions.    If during the course of the call the physician/provider feels a telephone visit is not appropriate, you will not be charged for this service.\"    Patient has given verbal consent for Telephone visit?  Yes    How would you like to obtain your AVS? MyChart    Subjective   .  SUBJECTIVE:  Chelo is here today to recheck ADHD/ADD.    Has been taking wellbutrin for smoking cessation as well and has decreased cravings, but is still smoking with her social zoom and is noticing that is hard to change.    Updates since last visit: none    Routine for taking medicine, including time: daily not at the same time  Time medicine wears off: about 7 hours after  Issues at work: none  Issues at home: none  Control of symptoms: yes    Side effects:  Headaches: No  Stomach aches: No  Irritability/mood swings: No  Difficulties with sleep: No  Social withdrawal: from covid but would go out if possible   Unusual movements/tics: No  Decreased appetite: No    Other concerns: none    Patient Active Problem List   Diagnosis     Other disorder of menstruation and other abnormal " bleeding from female genital tract     Tobacco use disorder     PMDD (premenstrual dysphoric disorder)     Menorrhagia     CARDIOVASCULAR SCREENING; LDL GOAL LESS THAN 160     ADHD (attention deficit hyperactivity disorder)     Osteoarthritis of hip     Anxiety       Past Medical History:   Diagnosis Date     ADHD      Other disorder of menstruation and other abnormal bleeding from female genital tract      Tobacco use disorder        Past Surgical History:   Procedure Laterality Date     ARTHROPLASTY HIP  5/2/2012    Procedure:ARTHROPLASTY HIP; Right Total Hip Arthroplasty; Surgeon:DAGOBERTO CAMPBELL; [facility]     ARTHROPLASTY HIP Left 1/12/2015    Procedure: ARTHROPLASTY HIP;  Surgeon: Dagoberto Campbell MD;  [facility]     C NONSPECIFIC PROCEDURE  2001    cervical cone procedure?     C NONSPECIFIC PROCEDURE      rt salpingectomy (uncertain if oophorectomy)     COSMETIC ABDOMINOPLASTY N/A 3/13/2018    Procedure: COSMETIC ABDOMINOPLASTY;;  Surgeon: Jinny Flores MD;  Location: [facility]     COSMETIC LIPOSUCTION TRUNK N/A 3/13/2018    Procedure: COSMETIC LIPOSUCTION TRUNK;  COSMETIC ABDOMINOPLASTY, COSMETIC POSTERIOR TRUNK LIPOSUCTION.;  Surgeon: Jinny Flores MD;  Location: [facility]     COSMETIC MAMMOPLASTY AUGMENTATION BILATERAL      implants     CYSTOSCOPY  3/14/2011    CYSTOSCOPY performed by RICHARD MACKEY at  OR     HYSTERECTOMY  2011    menorrhagia     HYSTERECTOMY, PAP NO LONGER INDICATED       LAPAROSCOPIC ASSISTED HYSTERECTOMY VAGINAL  3/14/2011    LAPAROSCOPIC ASSISTED HYSTERECTOMY VAGINAL performed by RICHARD MACKEY at  OR     LAPAROSCOPY DIAGNOSTIC (GYN)  3/14/2011    LAPAROSCOPY DIAGNOSTIC (GYN) performed by RICHARD MACKEY at  OR     SURGICAL HISTORY OF -   1985    Laparotomy for the fallopian tube abscess        Current Outpatient Medications   Medication     amphetamine-dextroamphetamine (ADDERALL XR) 20 MG 24 hr capsule     [START ON 5/29/2020]  amphetamine-dextroamphetamine (ADDERALL XR) 20 MG 24 hr capsule     [START ON 6/29/2020] amphetamine-dextroamphetamine (ADDERALL XR) 20 MG 24 hr capsule     buPROPion (WELLBUTRIN XL) 150 MG 24 hr tablet     No current facility-administered medications for this visit.        OBJECTIVE:  LMP 03/07/2011   Gen: alert, awake, NAD  Lungs: CTA russ  Heart: RR without murmur  Psych: doing well      ASSESSMENT:  1. Attention deficit hyperactivity disorder (ADHD), predominantly inattentive type    2. Tobacco abuse disorder        PLAN:  Patient is doing well with mood and focus. Would like to continue on meds. Though she is not doing well on smoking cessation as it is still an ingrained behavior she has not really worked to address. Enjoys her happy hours with her friends on zoom with alcohol and a cigarette,though is much less use before.  Refilled meds, f/u 3 months    Return in about 3 months (around 7/30/2020) for adhd.      Phone call duration:  6 minutes    More Lao MD, MD

## 2020-04-30 ENCOUNTER — VIRTUAL VISIT (OUTPATIENT)
Dept: FAMILY MEDICINE | Facility: OTHER | Age: 55
End: 2020-04-30
Payer: COMMERCIAL

## 2020-04-30 DIAGNOSIS — F90.0 ATTENTION DEFICIT HYPERACTIVITY DISORDER (ADHD), PREDOMINANTLY INATTENTIVE TYPE: ICD-10-CM

## 2020-04-30 DIAGNOSIS — Z72.0 TOBACCO ABUSE DISORDER: ICD-10-CM

## 2020-04-30 PROCEDURE — 99213 OFFICE O/P EST LOW 20 MIN: CPT | Mod: 95 | Performed by: FAMILY MEDICINE

## 2020-04-30 RX ORDER — DEXTROAMPHETAMINE SACCHARATE, AMPHETAMINE ASPARTATE MONOHYDRATE, DEXTROAMPHETAMINE SULFATE AND AMPHETAMINE SULFATE 5; 5; 5; 5 MG/1; MG/1; MG/1; MG/1
20 CAPSULE, EXTENDED RELEASE ORAL DAILY
Qty: 30 CAPSULE | Refills: 0 | Status: SHIPPED | OUTPATIENT
Start: 2020-05-29 | End: 2020-07-21

## 2020-04-30 RX ORDER — DEXTROAMPHETAMINE SACCHARATE, AMPHETAMINE ASPARTATE MONOHYDRATE, DEXTROAMPHETAMINE SULFATE AND AMPHETAMINE SULFATE 5; 5; 5; 5 MG/1; MG/1; MG/1; MG/1
20 CAPSULE, EXTENDED RELEASE ORAL DAILY
Qty: 30 CAPSULE | Refills: 0 | Status: SHIPPED | OUTPATIENT
Start: 2020-04-30 | End: 2020-07-21

## 2020-04-30 RX ORDER — BUPROPION HYDROCHLORIDE 150 MG/1
150 TABLET ORAL EVERY MORNING
Qty: 90 TABLET | Refills: 1 | Status: SHIPPED | OUTPATIENT
Start: 2020-04-30 | End: 2021-04-12

## 2020-04-30 RX ORDER — DEXTROAMPHETAMINE SACCHARATE, AMPHETAMINE ASPARTATE MONOHYDRATE, DEXTROAMPHETAMINE SULFATE AND AMPHETAMINE SULFATE 5; 5; 5; 5 MG/1; MG/1; MG/1; MG/1
20 CAPSULE, EXTENDED RELEASE ORAL DAILY
Qty: 30 CAPSULE | Refills: 0 | Status: SHIPPED | OUTPATIENT
Start: 2020-06-29 | End: 2020-07-21

## 2020-07-02 ENCOUNTER — TELEPHONE (OUTPATIENT)
Dept: FAMILY MEDICINE | Facility: OTHER | Age: 55
End: 2020-07-02

## 2020-07-02 DIAGNOSIS — Z91.030 BEE STING ALLERGY: Primary | ICD-10-CM

## 2020-07-02 RX ORDER — EPINEPHRINE 0.3 MG/.3ML
0.3 INJECTION SUBCUTANEOUS PRN
Qty: 2 EACH | Refills: 0 | Status: SHIPPED | OUTPATIENT
Start: 2020-07-02 | End: 2021-08-04

## 2020-07-02 NOTE — TELEPHONE ENCOUNTER
Epi pen is not on medlist will route to Provider pool to review and advise    Allie Ching, MSN, RN

## 2020-07-02 NOTE — TELEPHONE ENCOUNTER
Sent to pharmacy. Should make sure to discuss with Dr. Lao at next visit for future refills.     Junior Reynolds PA-C

## 2020-07-02 NOTE — TELEPHONE ENCOUNTER
Needs an epi pen before weekend, the one she has  2 years ago. She does have allergy to bees.    Please fill or call patient    [phone]    At work may not answer and mailbox is full.     Pended pharmacy

## 2020-07-16 NOTE — PROGRESS NOTES
Subjective     Chelo Lopez is a 55 year old female who presents to clinic today for the following health issues:    HPI   SUBJECTIVE:  Patient is here today to recheck ADHD/ADD.    Updates since last visit: No new updates  Routine for taking medicine, including time: 9-11am  Time medicine wears off: Does not notice  Issues at school/Work: None  Issues at home: None  Control of symptoms: YES    Side effects:  Headaches: No  Stomach aches: No  Irritability/mood swings: No  Difficulties with sleep: No  Social withdrawal: No  Unusual movements/tics: No  Decreased appetite: No    Other concerns: None      Joint Pain    Onset: February    Description:   Location: left shoulder  Character: Sharp    Intensity: severe    Progression of Symptoms: worse    Accompanying Signs & Symptoms:  Other symptoms: none    History:   Previous similar pain: no       Precipitating factors:   Trauma or overuse: YES- 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 hockey    Alleviating factors:  Improved by: nothing    Therapies Tried and outcome: None      Concern - Bump on arm  Onset: 3 years    Description:   Bump on left arm    Intensity:     Progression of Symptoms:  same    Accompanying Signs & Symptoms:  Sensitive to touch    Previous history of similar problem:   None    Precipitating factors:   Worsened by: none    Alleviating factors:  Improved by: none    Therapies Tried and outcome: nons        Patient Active Problem List   Diagnosis     Other disorder of menstruation and other abnormal bleeding from female genital tract     Tobacco use disorder     PMDD (premenstrual dysphoric disorder)     Menorrhagia     CARDIOVASCULAR SCREENING; LDL GOAL LESS THAN 160     ADHD (attention deficit hyperactivity disorder)     Osteoarthritis of hip     Anxiety     Bee sting allergy     Past Surgical History:   Procedure Laterality Date     ARTHROPLASTY HIP  5/2/2012    Procedure:ARTHROPLASTY HIP; Right Total Hip Arthroplasty; Surgeon:TROY CAMPBELL; Location:  OR     ARTHROPLASTY HIP Left 1/12/2015    Procedure: ARTHROPLASTY HIP;  Surgeon: Dagoberto Waddell MD;  [facility]     C NONSPECIFIC PROCEDURE  2001    cervical cone procedure?     C NONSPECIFIC PROCEDURE      rt salpingectomy (uncertain if oophorectomy)     COSMETIC ABDOMINOPLASTY N/A 3/13/2018    Procedure: COSMETIC ABDOMINOPLASTY;;  Surgeon: Jinny Flores MD;  Location: [facility]     COSMETIC LIPOSUCTION TRUNK N/A 3/13/2018    Procedure: COSMETIC LIPOSUCTION TRUNK;  COSMETIC ABDOMINOPLASTY, COSMETIC POSTERIOR TRUNK LIPOSUCTION.;  Surgeon: Jinny Flores MD;  Location: [facility]     COSMETIC MAMMOPLASTY AUGMENTATION BILATERAL      implants     CYSTOSCOPY  3/14/2011    CYSTOSCOPY performed by RICHARD MACKEY at  OR     HYSTERECTOMY  2011    menorrhagia     HYSTERECTOMY, PAP NO LONGER INDICATED       LAPAROSCOPIC ASSISTED HYSTERECTOMY VAGINAL  3/14/2011    LAPAROSCOPIC ASSISTED HYSTERECTOMY VAGINAL performed by RICHARD MACKEY at  OR     LAPAROSCOPY DIAGNOSTIC (GYN)  3/14/2011    LAPAROSCOPY DIAGNOSTIC (GYN) performed by RICHARD MACKEY at  OR     SURGICAL HISTORY OF -   1985    Laparotomy for the fallopian tube abscess        Social History     Tobacco Use     Smoking status: Current Every Day Smoker     Types: Cigarettes     Smokeless tobacco: Never Used     Tobacco comment: 1 pack per week    Substance Use Topics     Alcohol use: Yes     Comment: weekly     Family History   Problem Relation Age of Onset     Cerebrovascular Disease Father      Cancer Sister         skin     C.A.D. No family hx of      Breast Cancer No family hx of      Cancer - colorectal No family hx of            Reviewed and updated as needed this visit by Provider  Tobacco  Allergies  Meds  Problems  Med Hx  Surg Hx  Fam Hx         Review of Systems   Constitutional, HEENT, cardiovascular, pulmonary, GI, , musculoskeletal, neuro, skin, endocrine and psych systems are negative, except as otherwise  "noted.      Objective    /64   Pulse 78   Temp 98.4  F (36.9  C)   Resp 14   Ht 1.651 m (5' 5\")   Wt 71.7 kg (158 lb)   LMP 03/07/2011   BMI 26.29 kg/m    Body mass index is 26.29 kg/m .  Physical Exam   GENERAL: healthy, alert and no distress  RESP: lungs clear to auscultation - no rales, rhonchi or wheezes  CV: regular rate and rhythm, normal S1 S2, no S3 or S4, no murmur, click or rub, no peripheral edema and peripheral pulses strong  ABDOMEN: soft, nontender, no hepatosplenomegaly, no masses and bowel sounds normal  MS: Right Shoulder Exam     Tenderness   The patient is experiencing tenderness in the acromioclavicular joint.    Range of Motion   The patient has normal right shoulder ROM.    Muscle Strength   The patient has normal right shoulder strength.    Tests   Apprehension: negative  Cross arm: negative  Impingement: negative            SKIN: no suspicious lesions or rashes  NEURO: Normal strength and tone, mentation intact and speech normal  PSYCH: mentation appears normal, affect normal/bright            Assessment & Plan       ICD-10-CM    1. Acute pain of left shoulder  M25.512    2. Arthralgia of left acromioclavicular joint  M25.512 XR Shoulder Left 2 Views   3. Visit for screening mammogram  Z12.31    4. Screening for colon cancer  Z12.11 Fecal colorectal cancer screen (FIT)   5. Need for prophylactic vaccination and inoculation against influenza  Z23    6. Need for shingles vaccine  Z23 INJECTION INTRAMUSCULAR OR SUB-Q   7. Attention deficit hyperactivity disorder (ADHD), predominantly inattentive type  F90.0 amphetamine-dextroamphetamine (ADDERALL) 20 MG tablet     amphetamine-dextroamphetamine (ADDERALL) 20 MG tablet     amphetamine-dextroamphetamine (ADDERALL) 20 MG tablet     Discussed her shoulder which is her biggest concern. Has normal appearance and movement. Though pain at the AC joint and has some pain if overhead against resistance. Looks pretty normal, though she is " "bothered by the continued pain presence. Discussed avoiding overhead weights for now and consider evaluation with sports med in future if not improving. She would like someone now intially, but then informed they are not currently coming to the clinic and declined and will call back next month when we are more likely to have sports med here if still interested.    ADHD is stable, refilled meds.  is appropriate, is not up to date on drug screen. Advised completing today since she is in, though there is an appropriate ability to delay due to COVID and she would like to at this time. Follow-up 3 months, plan drug screen at that time.     Tobacco Cessation:   reports that she has been smoking cigarettes. She has never used smokeless tobacco.  Tobacco Cessation Action Plan: Information offered: Patient not interested at this time      BMI:   Estimated body mass index is 26.29 kg/m  as calculated from the following:    Height as of this encounter: 1.651 m (5' 5\").    Weight as of this encounter: 71.7 kg (158 lb).   Weight management plan: Discussed healthy diet and exercise guidelines      Return in about 2 weeks (around 8/4/2020) for if not improved with sports med.    More Lao MD, MD  [facility]    "

## 2020-07-21 ENCOUNTER — OFFICE VISIT (OUTPATIENT)
Dept: FAMILY MEDICINE | Facility: OTHER | Age: 55
End: 2020-07-21
Payer: COMMERCIAL

## 2020-07-21 ENCOUNTER — ANCILLARY PROCEDURE (OUTPATIENT)
Dept: GENERAL RADIOLOGY | Facility: OTHER | Age: 55
End: 2020-07-21
Attending: FAMILY MEDICINE
Payer: COMMERCIAL

## 2020-07-21 VITALS
DIASTOLIC BLOOD PRESSURE: 64 MMHG | BODY MASS INDEX: 26.33 KG/M2 | HEIGHT: 65 IN | RESPIRATION RATE: 14 BRPM | SYSTOLIC BLOOD PRESSURE: 110 MMHG | TEMPERATURE: 98.4 F | HEART RATE: 78 BPM | WEIGHT: 158 LBS

## 2020-07-21 DIAGNOSIS — Z23 NEED FOR SHINGLES VACCINE: ICD-10-CM

## 2020-07-21 DIAGNOSIS — M25.512 ARTHRALGIA OF LEFT ACROMIOCLAVICULAR JOINT: ICD-10-CM

## 2020-07-21 DIAGNOSIS — Z23 NEED FOR PROPHYLACTIC VACCINATION AND INOCULATION AGAINST INFLUENZA: ICD-10-CM

## 2020-07-21 DIAGNOSIS — Z12.31 VISIT FOR SCREENING MAMMOGRAM: ICD-10-CM

## 2020-07-21 DIAGNOSIS — M25.512 ACUTE PAIN OF LEFT SHOULDER: Primary | ICD-10-CM

## 2020-07-21 DIAGNOSIS — F90.0 ATTENTION DEFICIT HYPERACTIVITY DISORDER (ADHD), PREDOMINANTLY INATTENTIVE TYPE: ICD-10-CM

## 2020-07-21 DIAGNOSIS — Z12.11 SCREENING FOR COLON CANCER: ICD-10-CM

## 2020-07-21 PROCEDURE — 90750 HZV VACC RECOMBINANT IM: CPT | Performed by: FAMILY MEDICINE

## 2020-07-21 PROCEDURE — 90471 IMMUNIZATION ADMIN: CPT | Performed by: FAMILY MEDICINE

## 2020-07-21 PROCEDURE — 99214 OFFICE O/P EST MOD 30 MIN: CPT | Mod: 25 | Performed by: FAMILY MEDICINE

## 2020-07-21 PROCEDURE — 73030 X-RAY EXAM OF SHOULDER: CPT | Mod: LT

## 2020-07-21 RX ORDER — DEXTROAMPHETAMINE SACCHARATE, AMPHETAMINE ASPARTATE, DEXTROAMPHETAMINE SULFATE AND AMPHETAMINE SULFATE 5; 5; 5; 5 MG/1; MG/1; MG/1; MG/1
20 TABLET ORAL DAILY
Qty: 30 TABLET | Refills: 0 | Status: SHIPPED | OUTPATIENT
Start: 2020-09-21 | End: 2020-10-21

## 2020-07-21 RX ORDER — DEXTROAMPHETAMINE SACCHARATE, AMPHETAMINE ASPARTATE, DEXTROAMPHETAMINE SULFATE AND AMPHETAMINE SULFATE 5; 5; 5; 5 MG/1; MG/1; MG/1; MG/1
20 TABLET ORAL DAILY
Qty: 30 TABLET | Refills: 0 | Status: SHIPPED | OUTPATIENT
Start: 2020-07-21 | End: 2020-08-20

## 2020-07-21 RX ORDER — DEXTROAMPHETAMINE SACCHARATE, AMPHETAMINE ASPARTATE, DEXTROAMPHETAMINE SULFATE AND AMPHETAMINE SULFATE 5; 5; 5; 5 MG/1; MG/1; MG/1; MG/1
20 TABLET ORAL DAILY
Qty: 30 TABLET | Refills: 0 | Status: SHIPPED | OUTPATIENT
Start: 2020-08-21 | End: 2020-09-20

## 2020-07-21 ASSESSMENT — MIFFLIN-ST. JEOR: SCORE: 1302.56

## 2020-07-21 ASSESSMENT — PAIN SCALES - GENERAL: PAINLEVEL: NO PAIN (0)

## 2020-07-21 NOTE — NURSING NOTE
Prior to immunization administration, verified patients identity using patient s name and date of birth. Please see Immunization Activity for additional information.     Screening Questionnaire for Adult Immunization    Are you sick today?   No   Do you have allergies to medications, food, a vaccine component or latex?   No   Have you ever had a serious reaction after receiving a vaccination?   No   Do you have a long-term health problem with heart, lung, kidney, or metabolic disease (e.g., diabetes), asthma, a blood disorder, no spleen, complement component deficiency, a cochlear implant, or a spinal fluid leak?  Are you on long-term aspirin therapy?   No   Do you have cancer, leukemia, HIV/AIDS, or any other immune system problem?   No   Do you have a parent, brother, or sister with an immune system problem?   No   In the past 3 months, have you taken medications that affect  your immune system, such as prednisone, other steroids, or anticancer drugs; drugs for the treatment of rheumatoid arthritis, Crohn s disease, or psoriasis; or have you had radiation treatments?   No   Have you had a seizure, or a brain or other nervous system problem?   No   During the past year, have you received a transfusion of blood or blood    products, or been given immune (gamma) globulin or antiviral drug?   No   For women: Are you pregnant or is there a chance you could become       pregnant during the next month?   No   Have you received any vaccinations in the past 4 weeks?   No     Immunization questionnaire answers were all negative.        Per orders of Dr. Lao, injection of Shingrix given by Mary Alcala CMA. Patient instructed to remain in clinic for 15 minutes afterwards, and to report any adverse reaction to me immediately.       Screening performed by Mary Alcala CMA on 7/21/2020 at 3:54 PM.

## 2020-07-22 NOTE — RESULT ENCOUNTER NOTE
Chelo, your Xray read has moderate amount of arthritis in that AC joint as expected. Keep it limber, but less resistance for now and consider sports med if not improving.  Please let me know if you have any questions.  More Lao MD

## 2020-08-26 ENCOUNTER — TELEPHONE (OUTPATIENT)
Dept: FAMILY MEDICINE | Facility: OTHER | Age: 55
End: 2020-08-26

## 2020-08-26 ENCOUNTER — ANCILLARY PROCEDURE (OUTPATIENT)
Dept: MAMMOGRAPHY | Facility: OTHER | Age: 55
End: 2020-08-26
Payer: COMMERCIAL

## 2020-08-26 DIAGNOSIS — Z12.31 VISIT FOR SCREENING MAMMOGRAM: ICD-10-CM

## 2020-08-26 PROCEDURE — 77067 SCR MAMMO BI INCL CAD: CPT | Mod: TC

## 2020-08-26 NOTE — TELEPHONE ENCOUNTER
----- Message from LISSETT Lane CNP sent at 8/26/2020  3:02 PM CDT -----  Normal mammogram repeat in 1 year.       LISSETT Pineda CNP  Questions or concerns please feel free to send me a [employer] message or call me  Phone : [phone]

## 2020-08-26 NOTE — LETTER
August 27, 2020      Chelo Lopez  [address] [place] [address]        Dear Chelo,     We have tried to reach you and have been unsuccessful. This letter is to inform you that Hazel Villarreal reviewed your mammogram and states that it is normal. She just says that you need a repeat screening in one year.    Please let us know if you have any further questions or concerns.    Thank you,  Sherman Stovall [facility] Team

## 2020-08-26 NOTE — TELEPHONE ENCOUNTER
Tried calling patient, no answer or VM.  Appears she does not view my chart.  Please attempt again.

## 2020-10-28 ENCOUNTER — VIRTUAL VISIT (OUTPATIENT)
Dept: FAMILY MEDICINE | Facility: OTHER | Age: 55
End: 2020-10-28
Payer: COMMERCIAL

## 2020-10-28 DIAGNOSIS — F90.2 ATTENTION DEFICIT HYPERACTIVITY DISORDER (ADHD), COMBINED TYPE: ICD-10-CM

## 2020-10-28 DIAGNOSIS — F41.9 ANXIETY: ICD-10-CM

## 2020-10-28 DIAGNOSIS — Z20.822 SUSPECTED COVID-19 VIRUS INFECTION: Primary | ICD-10-CM

## 2020-10-28 PROCEDURE — 99214 OFFICE O/P EST MOD 30 MIN: CPT | Mod: 95 | Performed by: FAMILY MEDICINE

## 2020-10-28 RX ORDER — DEXTROAMPHETAMINE SACCHARATE, AMPHETAMINE ASPARTATE, DEXTROAMPHETAMINE SULFATE AND AMPHETAMINE SULFATE 2.5; 2.5; 2.5; 2.5 MG/1; MG/1; MG/1; MG/1
10 TABLET ORAL DAILY
Qty: 30 TABLET | Refills: 0 | Status: SHIPPED | OUTPATIENT
Start: 2020-10-28 | End: 2020-11-27

## 2020-10-28 RX ORDER — DEXTROAMPHETAMINE SACCHARATE, AMPHETAMINE ASPARTATE MONOHYDRATE, DEXTROAMPHETAMINE SULFATE AND AMPHETAMINE SULFATE 5; 5; 5; 5 MG/1; MG/1; MG/1; MG/1
CAPSULE, EXTENDED RELEASE ORAL
COMMUNITY
Start: 2020-07-01 | End: 2020-10-28

## 2020-10-28 RX ORDER — DEXTROAMPHETAMINE SACCHARATE, AMPHETAMINE ASPARTATE MONOHYDRATE, DEXTROAMPHETAMINE SULFATE AND AMPHETAMINE SULFATE 5; 5; 5; 5 MG/1; MG/1; MG/1; MG/1
CAPSULE, EXTENDED RELEASE ORAL
Qty: 30 CAPSULE | Refills: 0 | Status: SHIPPED | OUTPATIENT
Start: 2020-11-05 | End: 2020-12-16

## 2020-10-28 NOTE — PATIENT INSTRUCTIONS
Instructions for Patients  Your symptoms show that you may have coronavirus (COVID-19). This illness can cause fever, cough and trouble breathing. Many people get a mild case and get better on their own. Some people can get very sick.     Not all patients are tested for COVID-19. If you need to be tested, your care team will let you know.    How can I protect others?    Without a test, we can t know for sure that you have COVID-19. For safety, it s very important to follow these rules.    Stay home and away from others (self-isolate) until:    You ve had no fever--and no medicine that reduces fever--for 1 full day (24 hours), And     Your other symptoms have resolved (gotten better). For example, your cough or breathing has improved, And     At least 10 days have passed since your symptoms started.    During this time:    Stay in your own room (and use your own bathroom), if you can.    Stay away from others in your home. No hugging, kissing or shaking hands.    No visitors.    Don t go to work, school or anywhere else.     Clean  high touch  surfaces often (doorknobs, counters, handles, etc.). Use a household cleaning spray or wipes.    Cover your mouth and nose with a mask, tissue or washcloth to avoid spreading germs.    Wash your hands and face often. Use soap and water.    For more tips, go to https://www.cdc.gov/coronavirus/2019-ncov/downloads/10Things.pdf.    How can I take care of myself?    1. Get lots of rest. Drink extra fluids (unless a doctor has told you not to).     2. Take Tylenol (acetaminophen) for fever or pain. If you have liver or kidney problems, ask your family doctor if it's okay to take Tylenol.     Adults can take either:     650 mg (two 325 mg pills) every 4 to 6 hours, or     1,000 mg (two 500 mg pills) every 8 hours as needed.     Note: Don't take more than 3,000 mg in one day.   Acetaminophen is found in many medicines (both prescribed and over-the-counter medicines). Read all labels  to be sure you don't take too much.   For children, check the Tylenol bottle for the right dose. The dose is based on  the child's age or weight.    3. If you have other health problems (like cancer, heart failure, an organ transplant or severe kidney disease): Call your specialty clinic if you don't feel better in the next 2 days.    4. Know when to call 911: If your breathing is so bad that it keeps you from doing normal activities, call 911 or go to the emergency room. Tell them that you've been staying home and may have COVID-19.    It is recommended that you have a test for coronavirus (COVID-19). This illness can cause fever, cough and trouble breathing. Many people get a mild case and get better on their own. Some people can get very sick.     Please follow these steps:    1. We will call to schedule your test.  2. A member of our care team will ask you some questions. Then, they will use a swab to collect samples from your nose and throat.     Our testing team will send you your test results.    How can I protect others?    Stay home and away from others (self-isolate) until:    You ve had no fever--and no medicine that reduces fever--for 1 full day (24 hours). And      Your other symptoms have resolved (gotten better). For example, your cough or breathing has improved. And     At least 10 days have passed since your symptoms started.    Stay at least 6 feet away from others. (If someone will drive you to your test, stay in the backseat, as far away from the  as you can.)     Don t go to work, school or anywhere else. When it s time for your test, go straight to the testing site. Don t make any stops on the way there or back.     Wash your hands and face often. Use soap and water.     Cover your mouth and nose with a mask, tissue or washcloth.     Don t touch anyone. No hugging, kissing or handshakes.    How can I take care of myself?    5. Get lots of rest. Drink extra fluids (unless a doctor has told  you not to).     6. Take Tylenol (acetaminophen) for fever or pain. If you have liver or kidney problems, ask your family doctor if it's okay to take Tylenol.     Adults can take either:     650 mg (two 325 mg pills) every 4 to 6 hours, or     1,000 mg (two 500 mg pills) every 8 hours as needed.     Note: Don't take more than 3,000 mg in one day.   Acetaminophen is found in many medicines (both prescribed and over-the-counter medicines). Read all labels to be sure you don't take too much.   For children, check the Tylenol bottle for the right dose. The dose is based on  the child's age or weight.    7. If you have other health problems (like cancer, heart failure, an organ transplant or severe kidney disease): Call your specialty clinic if you don't feel better in the next 2 days.    8. Know when to call 911: If your breathing is so bad that it keeps you from doing normal activities, call 911 or go to the emergency room. Tell them that you've been staying home and may have COVID-19.      Thank you for limiting contact with others, wearing a simple mask to cover your cough, practice good hand hygiene habits and accessing our virtual services where possible to limit the spread of this virus.    For more information about COVID19 and options for caring for yourself at home, please visit the CDC website at https://www.cdc.gov/coronavirus/2019-ncov/about/steps-when-sick.html  For more options for care at [facility], please visit our website at [email or web address]

## 2020-10-28 NOTE — PROGRESS NOTES
"Chelo Lopez is a 55 year old female who is being evaluated via a billable telephone visit.      The patient has been notified of following:     \"This telephone visit will be conducted via a call between you and your physician/provider. We have found that certain health care needs can be provided without the need for a physical exam.  This service lets us provide the care you need with a short phone conversation.  If a prescription is necessary we can send it directly to your pharmacy.  If lab work is needed we can place an order for that and you can then stop by our lab to have the test done at a later time.    Telephone visits are billed at different rates depending on your insurance coverage. During this emergency period, for some insurers they may be billed the same as an in-person visit.  Please reach out to your insurance provider with any questions.    If during the course of the call the physician/provider feels a telephone visit is not appropriate, you will not be charged for this service.\"    Patient has given verbal consent for Telephone visit?  Yes    What phone number would you like to be contacted at? [phone]    How would you like to obtain your AVS? Solomon Panchal     Chelo Lopez is a 55 year old female who presents via phone visit today for the following health issues:    HPI     Acute Illness  Acute illness concerns:   Onset/Duration:  Symptoms:  Fever: YES- 101.8  Chills/Sweats: YES  Headache (location?): no  Sinus Pressure: no  Conjunctivitis:  no  Ear Pain: no  Rhinorrhea: no  Congestion: YES  Sore Throat: YES  Cough: YES  Wheeze: YES  Decreased Appetite: YES  Nausea: no  Vomiting: no  Diarrhea: YES  Dysuria/Freq.: no  Dysuria or Hematuria: no  Fatigue/Achiness: YES  Sick/Strep Exposure: YES  Therapies tried and outcome: None       Medication Followup of Adderall     Taking Medication as prescribed: yes    Side Effects:  None    Medication Helping Symptoms:  yes "     SUBJECTIVE:  Chelo is here today to recheck ADHD/ADD.    Updates since last visit: wearing off in evening    Routine for taking medicine, including time: 9 am   Time medicine wears off: 6 pm  Issues at work: no issues  Issues at home: doing well with completion of tasks  Control of symptoms: good    Side effects:  Headaches: No  Stomach aches: No  Irritability/mood swings: No  Difficulties with sleep: No  Social withdrawal: No  Unusual movements/tics: No  Decreased appetite: No    Other concerns: wearing out.    Patient Active Problem List   Diagnosis     Other disorder of menstruation and other abnormal bleeding from female genital tract     Tobacco use disorder     PMDD (premenstrual dysphoric disorder)     Menorrhagia     CARDIOVASCULAR SCREENING; LDL GOAL LESS THAN 160     ADHD (attention deficit hyperactivity disorder)     Osteoarthritis of hip     Anxiety     Bee sting allergy       Past Medical History:   Diagnosis Date     ADHD      Other disorder of menstruation and other abnormal bleeding from female genital tract      Tobacco use disorder        Past Surgical History:   Procedure Laterality Date     ARTHROPLASTY HIP  5/2/2012    Procedure:ARTHROPLASTY HIP; Right Total Hip Arthroplasty; Surgeon:DAGOBERTO CAMPBELL; Location: OR     ARTHROPLASTY HIP Left 1/12/2015    Procedure: ARTHROPLASTY HIP;  Surgeon: Dagoberto Campbell MD;  Location:  OR     COSMETIC ABDOMINOPLASTY N/A 3/13/2018    Procedure: COSMETIC ABDOMINOPLASTY;;  Surgeon: Jinny Flores MD;  Location: [facility]     COSMETIC LIPOSUCTION TRUNK N/A 3/13/2018    Procedure: COSMETIC LIPOSUCTION TRUNK;  COSMETIC ABDOMINOPLASTY, COSMETIC POSTERIOR TRUNK LIPOSUCTION.;  Surgeon: Jinny Flores MD;  Location: [facility]     COSMETIC MAMMOPLASTY AUGMENTATION BILATERAL      implants     CYSTOSCOPY  3/14/2011    CYSTOSCOPY performed by RICHARD MACKEY at  OR     HYSTERECTOMY  2011    menorrhagia     HYSTERECTOMY, PAP NO LONGER  INDICATED       LAPAROSCOPIC ASSISTED HYSTERECTOMY VAGINAL  3/14/2011    LAPAROSCOPIC ASSISTED HYSTERECTOMY VAGINAL performed by RICHARD MACKEY at  OR     LAPAROSCOPY DIAGNOSTIC (GYN)  3/14/2011    LAPAROSCOPY DIAGNOSTIC (GYN) performed by RICHARD MACKEY at  OR     SURGICAL HISTORY OF -   1985    Laparotomy for the fallopian tube abscess      [facility] NONSPECIFIC PROCEDURE  2001    cervical cone procedure?     [facility] NONSPECIFIC PROCEDURE      rt salpingectomy (uncertain if oophorectomy)       Current Outpatient Medications   Medication     amphetamine-dextroamphetamine (ADDERALL XR) 20 MG 24 hr capsule     buPROPion (WELLBUTRIN XL) 150 MG 24 hr tablet     EPINEPHrine (ANY BX GENERIC EQUIV) 0.3 MG/0.3ML injection 2-pack     No current facility-administered medications for this visit.              Review of Systems   Constitutional, HEENT, cardiovascular, pulmonary, GI, , musculoskeletal, neuro, skin, endocrine and psych systems are negative, except as otherwise noted.       Objective          Vitals:  No vitals were obtained today due to virtual visit.    healthy, alert and no distress  PSYCH: Alert and oriented times 3; coherent speech, normal   rate and volume, able to articulate logical thoughts, able   to abstract reason, no tangential thoughts, no hallucinations   or delusions  Her affect is normal  RESP: No cough, no audible wheezing, able to talk in full sentences  Remainder of exam unable to be completed due to telephone visits            Assessment/Plan:    Assessment & Plan       ICD-10-CM    1. Suspected COVID-19 virus infection  Z20.828 Symptomatic COVID-19 Virus (Coronavirus) by PCR   2. Attention deficit hyperactivity disorder (ADHD), combined type  F90.2 amphetamine-dextroamphetamine (ADDERALL XR) 20 MG 24 hr capsule     amphetamine-dextroamphetamine (ADDERALL) 10 MG tablet   3. Anxiety  F41.9             Patient is doing better with the adderall XR, but is having trouble with her late nights  that the meds don't help long enough. Would like to have something as needed for nights she has a lot going on. Will allow the extra as needed and will follow-up next month to see how much she used and how well it worked to determine changes.  At this time she is likely COVID, advised need for quarantining until testing is negative. OR if positive, will need to meet return to work guidelines given then.    Return in about 1 week (around 11/4/2020) for if not improved.    More Lao MD, MD  [facility]    Phone call duration:  15 minutes

## 2020-12-09 ENCOUNTER — MYC MEDICAL ADVICE (OUTPATIENT)
Dept: FAMILY MEDICINE | Facility: OTHER | Age: 55
End: 2020-12-09

## 2020-12-09 DIAGNOSIS — F90.2 ATTENTION DEFICIT HYPERACTIVITY DISORDER (ADHD), COMBINED TYPE: ICD-10-CM

## 2020-12-09 RX ORDER — DEXTROAMPHETAMINE SACCHARATE, AMPHETAMINE ASPARTATE MONOHYDRATE, DEXTROAMPHETAMINE SULFATE AND AMPHETAMINE SULFATE 5; 5; 5; 5 MG/1; MG/1; MG/1; MG/1
CAPSULE, EXTENDED RELEASE ORAL
Qty: 30 CAPSULE | Refills: 0 | OUTPATIENT
Start: 2020-12-09

## 2020-12-09 NOTE — TELEPHONE ENCOUNTER
Called and spoke with patient regarding message below.  Patient verbalized understanding.  Mary Preciado CMA ([facility])

## 2020-12-14 NOTE — PROGRESS NOTES
"Chelo Lopez is a 55 year old female who is being evaluated via a billable telephone visit.      The patient has been notified of following:     \"This telephone visit will be conducted via a call between you and your physician/provider. We have found that certain health care needs can be provided without the need for a physical exam.  This service lets us provide the care you need with a short phone conversation.  If a prescription is necessary we can send it directly to your pharmacy.  If lab work is needed we can place an order for that and you can then stop by our lab to have the test done at a later time.    Telephone visits are billed at different rates depending on your insurance coverage. During this emergency period, for some insurers they may be billed the same as an in-person visit.  Please reach out to your insurance provider with any questions.    If during the course of the call the physician/provider feels a telephone visit is not appropriate, you will not be charged for this service.\"    Patient has given verbal consent for Telephone visit?  Yes    What phone number would you like to be contacted at? [phone]    How would you like to obtain your AVS? MyChart    Subjective     Chelo Lopez is a 55 year old female who presents via phone visit today for the following health issues:    HPI      SUBJECTIVE:  Chelo is here today to recheck ADHD/ADD.    Updates since last visit:     Routine for taking medicine, including time: 9 - 10 am   Time medicine wears off: mid afternoon , booster when needed   Issues at work/school: none   Issues at home: none   Control of symptoms: well controlled     Side effects:  Headaches: No  Stomach aches: No  Irritability/mood swings: No  Difficulties with sleep: No  Social withdrawal: No  Unusual movements/tics: No  Decreased appetite: No      Patient Active Problem List   Diagnosis     Other disorder of menstruation and other abnormal bleeding from female " genital tract     Tobacco use disorder     PMDD (premenstrual dysphoric disorder)     Menorrhagia     CARDIOVASCULAR SCREENING; LDL GOAL LESS THAN 160     ADHD (attention deficit hyperactivity disorder)     Osteoarthritis of hip     Anxiety     Bee sting allergy       Past Medical History:   Diagnosis Date     ADHD      Other disorder of menstruation and other abnormal bleeding from female genital tract      Tobacco use disorder        Past Surgical History:   Procedure Laterality Date     ARTHROPLASTY HIP  5/2/2012    Procedure:ARTHROPLASTY HIP; Right Total Hip Arthroplasty; Surgeon:DAGOBERTO CAMPBELL; Location: OR     ARTHROPLASTY HIP Left 1/12/2015    Procedure: ARTHROPLASTY HIP;  Surgeon: Dagoberto Campbell MD;  Location:  OR     COSMETIC ABDOMINOPLASTY N/A 3/13/2018    Procedure: COSMETIC ABDOMINOPLASTY;;  Surgeon: Jinny Flores MD;  Location: [facility]     COSMETIC LIPOSUCTION TRUNK N/A 3/13/2018    Procedure: COSMETIC LIPOSUCTION TRUNK;  COSMETIC ABDOMINOPLASTY, COSMETIC POSTERIOR TRUNK LIPOSUCTION.;  Surgeon: Jinny Flores MD;  Location: [facility]     COSMETIC MAMMOPLASTY AUGMENTATION BILATERAL      implants     CYSTOSCOPY  3/14/2011    CYSTOSCOPY performed by RICHARD MACKEY at  OR     HYSTERECTOMY  2011    menorrhagia     HYSTERECTOMY, PAP NO LONGER INDICATED       LAPAROSCOPIC ASSISTED HYSTERECTOMY VAGINAL  3/14/2011    LAPAROSCOPIC ASSISTED HYSTERECTOMY VAGINAL performed by RICHARD MACKEY at  OR     LAPAROSCOPY DIAGNOSTIC (GYN)  3/14/2011    LAPAROSCOPY DIAGNOSTIC (GYN) performed by RICHARD MACKEY at  OR     SURGICAL HISTORY OF -   1985    Laparotomy for the fallopian tube abscess      [facility] NONSPECIFIC PROCEDURE  2001    cervical cone procedure?     [facility] NONSPECIFIC PROCEDURE      rt salpingectomy (uncertain if oophorectomy)       Current Outpatient Medications   Medication     amphetamine-dextroamphetamine (ADDERALL XR) 20 MG 24 hr capsule      amphetamine-dextroamphetamine (ADDERALL XR) 20 MG 24 hr capsule     [START ON 1/16/2021] amphetamine-dextroamphetamine (ADDERALL XR) 20 MG 24 hr capsule     [START ON 2/16/2021] amphetamine-dextroamphetamine (ADDERALL XR) 20 MG 24 hr capsule     amphetamine-dextroamphetamine (ADDERALL) 10 MG tablet     amphetamine-dextroamphetamine (ADDERALL) 10 MG tablet     [START ON 1/16/2021] amphetamine-dextroamphetamine (ADDERALL) 10 MG tablet     [START ON 2/16/2021] amphetamine-dextroamphetamine (ADDERALL) 10 MG tablet     buPROPion (WELLBUTRIN XL) 150 MG 24 hr tablet     EPINEPHrine (ANY BX GENERIC EQUIV) 0.3 MG/0.3ML injection 2-pack     zoster vaccine recombinant adjuvanted (SHINGRIX) injection     No current facility-administered medications for this visit.                   Review of Systems   Constitutional, HEENT, cardiovascular, pulmonary, GI, , musculoskeletal, neuro, skin, endocrine and psych systems are negative, except as otherwise noted.       Objective          Vitals:  No vitals were obtained today due to virtual visit.    healthy, alert and no distress  PSYCH: Alert and oriented times 3; coherent speech, normal   rate and volume, able to articulate logical thoughts, able   to abstract reason, no tangential thoughts, no hallucinations   or delusions  Her affect is normal  RESP: No cough, no audible wheezing, able to talk in full sentences  Remainder of exam unable to be completed due to telephone visits            Assessment/Plan:    Assessment & Plan       ICD-10-CM    1. Attention deficit hyperactivity disorder (ADHD), combined type  F90.2 amphetamine-dextroamphetamine (ADDERALL) 10 MG tablet     amphetamine-dextroamphetamine (ADDERALL XR) 20 MG 24 hr capsule     amphetamine-dextroamphetamine (ADDERALL) 10 MG tablet     amphetamine-dextroamphetamine (ADDERALL) 10 MG tablet     amphetamine-dextroamphetamine (ADDERALL) 10 MG tablet     amphetamine-dextroamphetamine (ADDERALL XR) 20 MG 24 hr capsule      amphetamine-dextroamphetamine (ADDERALL XR) 20 MG 24 hr capsule     amphetamine-dextroamphetamine (ADDERALL XR) 20 MG 24 hr capsule   2. Need for vaccination  Z23 zoster vaccine recombinant adjuvanted (SHINGRIX) injection            STarted taking the adderall XR plus adderall 10 mg booster.  Takes booster only on the longer days M-F. Will set up with 25 for the month so she can fill prescriptions together. Follow-up 3 months      Return in about 3 months (around 3/16/2021) for adhd.    More Lao MD, MD  [facility]    Phone call duration:  12 minutes

## 2020-12-16 ENCOUNTER — VIRTUAL VISIT (OUTPATIENT)
Dept: FAMILY MEDICINE | Facility: OTHER | Age: 55
End: 2020-12-16
Payer: COMMERCIAL

## 2020-12-16 DIAGNOSIS — F90.2 ATTENTION DEFICIT HYPERACTIVITY DISORDER (ADHD), COMBINED TYPE: Primary | ICD-10-CM

## 2020-12-16 DIAGNOSIS — Z23 NEED FOR VACCINATION: ICD-10-CM

## 2020-12-16 PROCEDURE — 99213 OFFICE O/P EST LOW 20 MIN: CPT | Mod: 95 | Performed by: FAMILY MEDICINE

## 2020-12-16 RX ORDER — DEXTROAMPHETAMINE SACCHARATE, AMPHETAMINE ASPARTATE MONOHYDRATE, DEXTROAMPHETAMINE SULFATE AND AMPHETAMINE SULFATE 5; 5; 5; 5 MG/1; MG/1; MG/1; MG/1
CAPSULE, EXTENDED RELEASE ORAL
Qty: 30 CAPSULE | Refills: 0 | Status: SHIPPED | OUTPATIENT
Start: 2020-12-16 | End: 2021-04-12

## 2020-12-16 RX ORDER — DEXTROAMPHETAMINE SACCHARATE, AMPHETAMINE ASPARTATE, DEXTROAMPHETAMINE SULFATE AND AMPHETAMINE SULFATE 2.5; 2.5; 2.5; 2.5 MG/1; MG/1; MG/1; MG/1
10 TABLET ORAL DAILY
Qty: 25 TABLET | Refills: 0 | Status: SHIPPED | OUTPATIENT
Start: 2020-12-16 | End: 2021-01-15

## 2020-12-16 RX ORDER — DEXTROAMPHETAMINE SACCHARATE, AMPHETAMINE ASPARTATE, DEXTROAMPHETAMINE SULFATE AND AMPHETAMINE SULFATE 2.5; 2.5; 2.5; 2.5 MG/1; MG/1; MG/1; MG/1
10 TABLET ORAL DAILY
Qty: 25 TABLET | Refills: 0 | Status: SHIPPED | OUTPATIENT
Start: 2021-02-16 | End: 2021-03-18

## 2020-12-16 RX ORDER — ZOSTER VACCINE RECOMBINANT, ADJUVANTED 50 MCG/0.5
1 KIT INTRAMUSCULAR ONCE
Qty: 0.5 ML | Refills: 0 | Status: SHIPPED | OUTPATIENT
Start: 2020-12-16 | End: 2020-12-16

## 2020-12-16 RX ORDER — DEXTROAMPHETAMINE SACCHARATE, AMPHETAMINE ASPARTATE MONOHYDRATE, DEXTROAMPHETAMINE SULFATE AND AMPHETAMINE SULFATE 5; 5; 5; 5 MG/1; MG/1; MG/1; MG/1
20 CAPSULE, EXTENDED RELEASE ORAL DAILY
Qty: 30 CAPSULE | Refills: 0 | Status: SHIPPED | OUTPATIENT
Start: 2021-02-16 | End: 2021-03-18

## 2020-12-16 RX ORDER — DEXTROAMPHETAMINE SACCHARATE, AMPHETAMINE ASPARTATE, DEXTROAMPHETAMINE SULFATE AND AMPHETAMINE SULFATE 2.5; 2.5; 2.5; 2.5 MG/1; MG/1; MG/1; MG/1
10 TABLET ORAL DAILY
Qty: 25 TABLET | Refills: 0 | Status: SHIPPED | OUTPATIENT
Start: 2021-01-16 | End: 2021-02-15

## 2020-12-16 RX ORDER — DEXTROAMPHETAMINE SACCHARATE, AMPHETAMINE ASPARTATE MONOHYDRATE, DEXTROAMPHETAMINE SULFATE AND AMPHETAMINE SULFATE 5; 5; 5; 5 MG/1; MG/1; MG/1; MG/1
20 CAPSULE, EXTENDED RELEASE ORAL DAILY
Qty: 30 CAPSULE | Refills: 0 | Status: SHIPPED | OUTPATIENT
Start: 2020-12-16 | End: 2021-01-15

## 2020-12-16 RX ORDER — DEXTROAMPHETAMINE SACCHARATE, AMPHETAMINE ASPARTATE MONOHYDRATE, DEXTROAMPHETAMINE SULFATE AND AMPHETAMINE SULFATE 5; 5; 5; 5 MG/1; MG/1; MG/1; MG/1
20 CAPSULE, EXTENDED RELEASE ORAL DAILY
Qty: 30 CAPSULE | Refills: 0 | Status: SHIPPED | OUTPATIENT
Start: 2021-01-16 | End: 2021-02-15

## 2020-12-16 RX ORDER — DEXTROAMPHETAMINE SACCHARATE, AMPHETAMINE ASPARTATE, DEXTROAMPHETAMINE SULFATE AND AMPHETAMINE SULFATE 2.5; 2.5; 2.5; 2.5 MG/1; MG/1; MG/1; MG/1
10 TABLET ORAL DAILY
Qty: 25 TABLET | Refills: 0 | Status: SHIPPED | OUTPATIENT
Start: 2020-12-16 | End: 2021-04-12

## 2021-01-10 ENCOUNTER — HEALTH MAINTENANCE LETTER (OUTPATIENT)
Age: 56
End: 2021-01-10

## 2021-03-23 ENCOUNTER — TELEPHONE (OUTPATIENT)
Dept: NURSING | Facility: CLINIC | Age: 56
End: 2021-03-23

## 2021-03-23 NOTE — TELEPHONE ENCOUNTER
Patient calls to get a refill of Adderall 20 mg 24 hr capsule. Patient was just at the pharmacy and was told that they can fill the 10 mg tablet but not the 20 mg. Patient was last seen 12/16/20, was to follow up in about 3 months. Patient hasn't yet scheduled a follow up visit.    Please advise on refill.    Antonette Sanz RN  [facility]

## 2021-03-24 NOTE — TELEPHONE ENCOUNTER
Patient is asking if she can get 1 month of the 20mg and schedule next month for med check, so she can get them refilled to be on the same schedule.     She understands they are controlled and needs appt and will come in, but that will still have them on an off schedule for refills.

## 2021-03-24 NOTE — TELEPHONE ENCOUNTER
No, last seen 12/16, due for follow-up visit.  Please schedule. ADHD meds are evaluated every 3 months routinely.  More Lao MD

## 2021-04-09 NOTE — PROGRESS NOTES
{PROVIDER CHARTING PREFERENCE:790265}    Abudlkadir Whitney is a 56 year old who presents for the following health issues {ACCOMPANIED BY STATEMENT (Optional):270190}    HPI     {SUPERLIST (Optional):427741}  {additonal problems for provider to add (Optional):597433}    Review of Systems   {ROS COMP (Optional):839600}      Objective    LMP 03/07/2011   There is no height or weight on file to calculate BMI.  Physical Exam   {Exam List (Optional):805652}    {Diagnostic Test Results (Optional):438447}    {AMBULATORY ATTESTATION (Optional):708579}

## 2021-04-12 ENCOUNTER — OFFICE VISIT (OUTPATIENT)
Dept: FAMILY MEDICINE | Facility: OTHER | Age: 56
End: 2021-04-12
Payer: COMMERCIAL

## 2021-04-12 VITALS
WEIGHT: 149.5 LBS | HEIGHT: 65 IN | OXYGEN SATURATION: 98 % | RESPIRATION RATE: 20 BRPM | HEART RATE: 72 BPM | BODY MASS INDEX: 24.91 KG/M2 | TEMPERATURE: 97.5 F | SYSTOLIC BLOOD PRESSURE: 108 MMHG | DIASTOLIC BLOOD PRESSURE: 70 MMHG

## 2021-04-12 DIAGNOSIS — F17.200 TOBACCO USE DISORDER: ICD-10-CM

## 2021-04-12 DIAGNOSIS — F90.2 ATTENTION DEFICIT HYPERACTIVITY DISORDER (ADHD), COMBINED TYPE: Primary | ICD-10-CM

## 2021-04-12 DIAGNOSIS — Z13.220 SCREENING FOR HYPERLIPIDEMIA: ICD-10-CM

## 2021-04-12 DIAGNOSIS — Z11.4 SCREENING FOR HIV (HUMAN IMMUNODEFICIENCY VIRUS): ICD-10-CM

## 2021-04-12 DIAGNOSIS — Z72.0 TOBACCO ABUSE DISORDER: ICD-10-CM

## 2021-04-12 LAB
AMPHETAMINES UR QL: ABNORMAL NG/ML
BARBITURATES UR QL SCN: NOT DETECTED NG/ML
BENZODIAZ UR QL SCN: NOT DETECTED NG/ML
BUPRENORPHINE UR QL: NOT DETECTED NG/ML
CANNABINOIDS UR QL: NOT DETECTED NG/ML
CHOLEST SERPL-MCNC: 227 MG/DL
COCAINE UR QL SCN: NOT DETECTED NG/ML
D-METHAMPHET UR QL: NOT DETECTED NG/ML
HDLC SERPL-MCNC: 78 MG/DL
LDLC SERPL CALC-MCNC: 120 MG/DL
METHADONE UR QL SCN: NOT DETECTED NG/ML
NONHDLC SERPL-MCNC: 149 MG/DL
OPIATES UR QL SCN: NOT DETECTED NG/ML
OXYCODONE UR QL SCN: NOT DETECTED NG/ML
PCP UR QL SCN: NOT DETECTED NG/ML
PROPOXYPH UR QL: NOT DETECTED NG/ML
TRICYCLICS UR QL SCN: NOT DETECTED NG/ML
TRIGL SERPL-MCNC: 146 MG/DL

## 2021-04-12 PROCEDURE — 87389 HIV-1 AG W/HIV-1&-2 AB AG IA: CPT | Performed by: FAMILY MEDICINE

## 2021-04-12 PROCEDURE — 99213 OFFICE O/P EST LOW 20 MIN: CPT | Performed by: FAMILY MEDICINE

## 2021-04-12 PROCEDURE — 80061 LIPID PANEL: CPT | Performed by: FAMILY MEDICINE

## 2021-04-12 PROCEDURE — 80306 DRUG TEST PRSMV INSTRMNT: CPT | Performed by: FAMILY MEDICINE

## 2021-04-12 PROCEDURE — 36415 COLL VENOUS BLD VENIPUNCTURE: CPT | Performed by: FAMILY MEDICINE

## 2021-04-12 RX ORDER — BUPROPION HYDROCHLORIDE 150 MG/1
150 TABLET ORAL EVERY MORNING
Qty: 90 TABLET | Refills: 1 | Status: SHIPPED | OUTPATIENT
Start: 2021-04-12 | End: 2021-04-12 | Stop reason: ALTCHOICE

## 2021-04-12 RX ORDER — DEXTROAMPHETAMINE SACCHARATE, AMPHETAMINE ASPARTATE, DEXTROAMPHETAMINE SULFATE AND AMPHETAMINE SULFATE 2.5; 2.5; 2.5; 2.5 MG/1; MG/1; MG/1; MG/1
10 TABLET ORAL DAILY
Qty: 25 TABLET | Refills: 0 | Status: SHIPPED | OUTPATIENT
Start: 2021-04-12 | End: 2024-09-04

## 2021-04-12 RX ORDER — DEXTROAMPHETAMINE SACCHARATE, AMPHETAMINE ASPARTATE MONOHYDRATE, DEXTROAMPHETAMINE SULFATE AND AMPHETAMINE SULFATE 5; 5; 5; 5 MG/1; MG/1; MG/1; MG/1
CAPSULE, EXTENDED RELEASE ORAL
Qty: 30 CAPSULE | Refills: 0 | Status: SHIPPED | OUTPATIENT
Start: 2021-04-12 | End: 2024-09-04

## 2021-04-12 RX ORDER — BUPROPION HYDROCHLORIDE 150 MG/1
150 TABLET ORAL EVERY MORNING
Qty: 90 TABLET | Refills: 1 | Status: SHIPPED | OUTPATIENT
Start: 2021-04-12 | End: 2021-06-24

## 2021-04-12 ASSESSMENT — MIFFLIN-ST. JEOR: SCORE: 1259.01

## 2021-04-12 ASSESSMENT — PAIN SCALES - GENERAL: PAINLEVEL: MILD PAIN (3)

## 2021-04-12 ASSESSMENT — ANXIETY QUESTIONNAIRES
GAD7 TOTAL SCORE: 0
2. NOT BEING ABLE TO STOP OR CONTROL WORRYING: NOT AT ALL
7. FEELING AFRAID AS IF SOMETHING AWFUL MIGHT HAPPEN: NOT AT ALL
1. FEELING NERVOUS, ANXIOUS, OR ON EDGE: NOT AT ALL
GAD7 TOTAL SCORE: 0
6. BECOMING EASILY ANNOYED OR IRRITABLE: NOT AT ALL
GAD7 TOTAL SCORE: 0
5. BEING SO RESTLESS THAT IT IS HARD TO SIT STILL: NOT AT ALL
3. WORRYING TOO MUCH ABOUT DIFFERENT THINGS: NOT AT ALL
4. TROUBLE RELAXING: NOT AT ALL
7. FEELING AFRAID AS IF SOMETHING AWFUL MIGHT HAPPEN: NOT AT ALL

## 2021-04-12 ASSESSMENT — PATIENT HEALTH QUESTIONNAIRE - PHQ9
SUM OF ALL RESPONSES TO PHQ QUESTIONS 1-9: 1
10. IF YOU CHECKED OFF ANY PROBLEMS, HOW DIFFICULT HAVE THESE PROBLEMS MADE IT FOR YOU TO DO YOUR WORK, TAKE CARE OF THINGS AT HOME, OR GET ALONG WITH OTHER PEOPLE: NOT DIFFICULT AT ALL
SUM OF ALL RESPONSES TO PHQ QUESTIONS 1-9: 1

## 2021-04-12 NOTE — LETTER
[facility]YA [place]  04/12/21  Patient: Chelo Lopez  YOB: 1965  Medical Record Number: [id number]                                                                             Non-Opioid Controlled Substance Agreement    This is an agreement between you and your provider regarding safe and appropriate controlled substance prescribing.? Controlled substances are?medicines that can cause physical and mental dependence. The manufacturing, possession and use of these medicines are regulated by law.  We here at [facility] are making a commitment to work with you in your efforts to get better.? To support you in this work, we will help you schedule regular appointments for medicine refills. If we must cancel or change your appointment for any reason, we will make sure you have enough medication to last until your next appointment.      As a Provider, I will:     Listen carefully to your concerns while treating you with dignity.     Recommend a treatment plan that I believe is in your best interest and may involve therapies other than medication.      Review the chance of benefit and the chance of harm of this medicine with you regularly and evaluate the safety and effectiveness of this therapy.       Provide a plan on how to discontinue if the decision is made to stop this medicine.       As a Patient, I understand controlled substances:       Are prescribed by my care provider to help me function or work and manage my condition(s).?    Are strong medicines and can cause serious side effects such as:     Drowsiness, which can seriously affect my driving ability    A lower breathing rate, enough to cause death    Harm to my thinking ability     Depression     Abuse of and addiction to this medicine.      Need to be taken exactly as prescribed.?Combining controlled substances with certain medicines or chemicals (such as illegal drugs, opioids, sedatives, sleeping pills, and  benzodiazepines) can be dangerous or even fatal.? If I stop taking my medicines suddenly, I may have severe withdrawal symptoms.     The risks, benefits, and side effects of these medicine(s) were explained to me. I agree that:    1. I will take part in other treatments as advised by my care team. This may be psychiatry or counseling, physical therapy, behavioral therapy, group treatment or a referral to specialist.    2. I will keep all my appointments and understand this is part of the monitoring of controlled substance.?My care team may require an office visit for EVERY controlled substance refill. If I miss appointments or don t follow instructions, my care team may stop my medicine    3. I will take my medicines as prescribed. I will not change the dose or schedule unless my care team tells me to. There will be no refills if I run out early.      4. I may be contactedwithout warning and asked to complete a urine drug test or pill count at any time. If I don t give a urine sample or participate in a pill count, the care team may stop my medicine.    5. I will only receive controlled substance prescriptions from this clinic. If treated by another provider, I will let them know I am taking controlled substances and that I have a treatment agreement with this provider. I will inform this care team within one business day if I am given a prescription for any controlled substance by another healthcare provider. This care team may contact other providers and pharmacists about my use of the medicines.     6. It is up to me to make sure that I do not run out of my medicines on weekends or holidays.?If my care team is willing to refill my prescription without a visit, I must request refills only during office hours. Refills may take up to 3 business days to process.  I will use one pharmacy to fill all my controlled substance prescriptions.  I will notify the clinic if any changes are made due to insurance changes or  medication availability.    7. I am responsible for my prescriptions. If the medicine/prescription is lost, stolen or destroyed, it will not be replaced.?I also agree not to share controlled substance medicines with anyone.     8. I am aware I should not use any illegal or recreational drugs. I also agree not to drink alcohol unless my care team says I can.     9. If I enroll in the [place] Medical Cannabis program, I will tell my care team.?    10. I will tell my care team right away if I become pregnant, have a new medical problem treated outside of my regular clinic, or have a change in my medications.     11. I understand that this medicine can affect my thinking, judgment and reaction time.? Alcohol and drugs affect the brain and body, which can affect the safety of a person s driving. Being under the influence of alcohol or drugs can affect a person s decision-making, behaviors, personal safety, and the safety of others. Driving while impaired (DWI) can occur if a person is driving, operating, or in physical control of a car, motorcycle, boat, snowmobile, ATV, motorbike, off-road vehicle, or any other motor vehicle ([place] Statute 169[place].20). I understand the risk if I choose to drive or operate machinery  I understand that if I do not follow any of the conditions above, my prescriptions or treatment may be stopped or changed.     I agree that my provider, clinic care team, and pharmacy may work with any city, state or federal law enforcement agency that investigates the misuse, sale, or other diversion of my controlled medicine. I will allow my provider to discuss my care with or share a copy of this agreement with any other treating provider, pharmacy or emergency room where I receive care.?    I have read this agreement and have asked questions about anything I did not understand.    ________________________________________________________  Patient Signature - Chelosolitario Lopez     ___________________                    Date     ________________________________________________________  Provider Signature - More Edman, MD, MD       ___________________                   Date     _______________________________________________________  Witness Signature (required if provider not present while patient signing)           ___________________                   Date

## 2021-04-12 NOTE — PROGRESS NOTES
Answers for HPI/ROS submitted by the patient on 4/12/2021   Chronic problems general questions HPI Form  If you checked off any problems, how difficult have these problems made it for you to do your work, take care of things at home, or get along with other people?: Not difficult at all  PHQ9 TOTAL SCORE: 1  EDMUNDO 7 TOTAL SCORE: 0  How many servings of fruits and vegetables do you eat daily?: 0-1  On average, how many sweetened beverages do you drink each day (Examples: soda, juice, sweet tea, etc.  Do NOT count diet or artificially sweetened beverages)?: 0  How many minutes a day do you exercise enough to make your heart beat faster?: 20 to 29  How many days a week do you exercise enough to make your heart beat faster?: 5  How many days per week do you miss taking your medication?: 0  SUBJECTIVE:  Chelo is here today to recheck ADHD/ADD.    Updates since last visit: none    Routine for taking medicine, including time: 9-10 am   Time medicine wears off: 3-4 pm   Issues at school: none  Issues at home: none  Control of symptoms: yes    Side effects:  Headaches: No  Stomach aches: No  Irritability/mood swings: No  Difficulties with sleep: No  Social withdrawal: No  Unusual movements/tics: No  Decreased appetite: No    Other concerns: none    Patient Active Problem List   Diagnosis     Other disorder of menstruation and other abnormal bleeding from female genital tract     Tobacco use disorder     PMDD (premenstrual dysphoric disorder)     Menorrhagia     CARDIOVASCULAR SCREENING; LDL GOAL LESS THAN 160     ADHD (attention deficit hyperactivity disorder)     Osteoarthritis of hip     Anxiety     Bee sting allergy       Past Medical History:   Diagnosis Date     ADHD      Other disorder of menstruation and other abnormal bleeding from female genital tract      Tobacco use disorder        Past Surgical History:   Procedure Laterality Date     ARTHROPLASTY HIP  5/2/2012    Procedure:ARTHROPLASTY HIP; Right Total Hip  "Arthroplasty; Surgeon:DAGOBERTO CAMPBELL; [facility]     ARTHROPLASTY HIP Left 1/12/2015    Procedure: ARTHROPLASTY HIP;  Surgeon: Dagoberto Campbell MD;  [facility]     COSMETIC ABDOMINOPLASTY N/A 3/13/2018    Procedure: COSMETIC ABDOMINOPLASTY;;  Surgeon: Jinny Flores MD;  Location: [facility]     COSMETIC LIPOSUCTION TRUNK N/A 3/13/2018    Procedure: COSMETIC LIPOSUCTION TRUNK;  COSMETIC ABDOMINOPLASTY, COSMETIC POSTERIOR TRUNK LIPOSUCTION.;  Surgeon: Jinny Flores MD;  Location: [facility]     COSMETIC MAMMOPLASTY AUGMENTATION BILATERAL      implants     CYSTOSCOPY  3/14/2011    CYSTOSCOPY performed by RICHARD MACKEY at  OR     HYSTERECTOMY  2011    menorrhagia     HYSTERECTOMY, PAP NO LONGER INDICATED       LAPAROSCOPIC ASSISTED HYSTERECTOMY VAGINAL  3/14/2011    LAPAROSCOPIC ASSISTED HYSTERECTOMY VAGINAL performed by RICHARD MACKEY at  OR     LAPAROSCOPY DIAGNOSTIC (GYN)  3/14/2011    LAPAROSCOPY DIAGNOSTIC (GYN) performed by RICHARD MACKEY at  OR     SURGICAL HISTORY OF -   1985    Laparotomy for the fallopian tube abscess      [facility] NONSPECIFIC PROCEDURE  2001    cervical cone procedure?     [facility] NONSPECIFIC PROCEDURE      rt salpingectomy (uncertain if oophorectomy)       Current Outpatient Medications   Medication     amphetamine-dextroamphetamine (ADDERALL XR) 20 MG 24 hr capsule     amphetamine-dextroamphetamine (ADDERALL) 10 MG tablet     buPROPion (WELLBUTRIN XL) 150 MG 24 hr tablet     EPINEPHrine (ANY BX GENERIC EQUIV) 0.3 MG/0.3ML injection 2-pack     No current facility-administered medications for this visit.        OBJECTIVE:  /70   Pulse 72   Temp 97.5  F (36.4  C) (Temporal)   Resp 20   Ht 1.651 m (5' 5\")   Wt 67.8 kg (149 lb 8 oz)   LMP 03/07/2011   SpO2 98%   BMI 24.88 kg/m    Gen: alert, awake, NAD  Lungs: CTA russ  Heart: RR without murmur  Psych: stressed.      ASSESSMENT:  1. Attention deficit hyperactivity disorder (ADHD), combined type  "   2. Tobacco use disorder    3. Screening for HIV (human immunodeficiency virus)    4. Screening for hyperlipidemia        PLAN:  No follow-ups on file.     SUrescripts shows fill history, though  does not. Not sure why it is not showing on , but by pharmacy data, appears appropriate.  Updated UDS and CSA today and filled for 1 month.  Had really worked to reduce smoking but is still smoking, so renewed wellbutrin for now. Though encouraged to actually quit this time.  Plan 3 months refills if UDS is as expected.   In addition to preventative visit, 20 min spent on the date of the encounter in chart review, patient visit, review of tests, documentation and/or discussion with other providers about the issues documented above.     MD More Escalante MD

## 2021-04-13 LAB — HIV 1+2 AB+HIV1 P24 AG SERPL QL IA: NONREACTIVE

## 2021-04-13 ASSESSMENT — PATIENT HEALTH QUESTIONNAIRE - PHQ9: SUM OF ALL RESPONSES TO PHQ QUESTIONS 1-9: 1

## 2021-04-13 ASSESSMENT — ANXIETY QUESTIONNAIRES: GAD7 TOTAL SCORE: 0

## 2021-06-22 ENCOUNTER — TELEPHONE (OUTPATIENT)
Dept: FAMILY MEDICINE | Facility: OTHER | Age: 56
End: 2021-06-22

## 2021-06-22 NOTE — TELEPHONE ENCOUNTER
Called to informed pt that per pt last appointment d//c patient was to follow up in 3 months. Patient did not answer and VM was full. Will try again later. Yulisa ALEX [facility]     [employer] message sent.

## 2021-06-23 NOTE — PROGRESS NOTES
Irma is a 56 year old who is being evaluated via a billable telephone visit.      What phone number would you like to be contacted at? [phone]   How would you like to obtain your AVS? Solomon    Assessment & Plan       ICD-10-CM    1. Attention deficit hyperactivity disorder (ADHD), combined type  F90.2 amphetamine-dextroamphetamine (ADDERALL XR) 20 MG 24 hr capsule     amphetamine-dextroamphetamine (ADDERALL XR) 20 MG 24 hr capsule     amphetamine-dextroamphetamine (ADDERALL XR) 20 MG 24 hr capsule     amphetamine-dextroamphetamine (ADDERALL) 10 MG tablet     amphetamine-dextroamphetamine (ADDERALL) 10 MG tablet     amphetamine-dextroamphetamine (ADDERALL) 10 MG tablet        Patient is doing well on ADHD control and would like to continue her current medications.  Refills given for these 2 medications and plan to follow-up in 3 months.  She has not been needing her Wellbutrin for either the ADHD control or the smoking cessation as she is only occasionally using cigarettes when she is out with friends.  She has previously used this for mood control in the past and is concerned about whether or not this will return for her but has had well controlled for a long time.  So discussed the likelihood of success is she is otherwise stable on an attempt to stop her Wellbutrin.  Agreed to this today and plan to hold her Wellbutrin currently and return to but only as needed for her symptom control.    14 minutes spent on the date of the encounter doing chart review, history and exam, documentation and further activities per the note       Tobacco Cessation:   reports that she has been smoking cigarettes. She has never used smokeless tobacco.  Tobacco Cessation Action Plan: Self help information given to patient  Only occ use, so just needs to focus on behavioral changes      Return in about 3 months (around 9/24/2021) for adhd.    More Lao MD, MD  [facility] is a 56 year old  who presents for the following health issues     HPI       Chelo is here today to recheck ADHD/ADD.    Updates since last visit: none    Routine for taking medicine, including time: in AM and pm if needed   Time medicine wears off: immune?   Issues at school: none  Issues at home: none  Control of symptoms: yes     Side effects:  Headaches: No  Stomach aches: No  Irritability/mood swings: No  Difficulties with sleep: No  Social withdrawal: No  Unusual movements/tics: No  Decreased appetite: No    Other concerns: none    Medication Followup of Wellbutrin     Taking Medication as prescribed: yes    Side Effects:  None    Medication Helping Symptoms:  yes     Patient is using wellbutrin for smoking cessation. She is still indulge now and then.    She had used this in the past for mood, but has been stable a long time. Would be interested in a trial off of meds.        Review of Systems   Constitutional, HEENT, cardiovascular, pulmonary, GI, , musculoskeletal, neuro, skin, endocrine and psych systems are negative, except as otherwise noted.      Objective           Vitals:  No vitals were obtained today due to virtual visit.    Physical Exam   healthy, alert and no distress  PSYCH: Alert and oriented times 3; coherent speech, normal   rate and volume, able to articulate logical thoughts, able   to abstract reason, no tangential thoughts, no hallucinations   or delusions  Her affect is normal  RESP: No cough, no audible wheezing, able to talk in full sentences  Remainder of exam unable to be completed due to telephone visits        Phone call duration: 10 minutes

## 2021-06-24 ENCOUNTER — VIRTUAL VISIT (OUTPATIENT)
Dept: FAMILY MEDICINE | Facility: OTHER | Age: 56
End: 2021-06-24
Payer: COMMERCIAL

## 2021-06-24 ENCOUNTER — TELEPHONE (OUTPATIENT)
Dept: FAMILY MEDICINE | Facility: OTHER | Age: 56
End: 2021-06-24

## 2021-06-24 DIAGNOSIS — F90.2 ATTENTION DEFICIT HYPERACTIVITY DISORDER (ADHD), COMBINED TYPE: ICD-10-CM

## 2021-06-24 PROCEDURE — 99213 OFFICE O/P EST LOW 20 MIN: CPT | Mod: 95 | Performed by: FAMILY MEDICINE

## 2021-06-24 RX ORDER — DEXTROAMPHETAMINE SACCHARATE, AMPHETAMINE ASPARTATE, DEXTROAMPHETAMINE SULFATE AND AMPHETAMINE SULFATE 2.5; 2.5; 2.5; 2.5 MG/1; MG/1; MG/1; MG/1
10 TABLET ORAL 2 TIMES DAILY
Qty: 60 TABLET | Refills: 0 | Status: SHIPPED | OUTPATIENT
Start: 2021-08-25 | End: 2021-09-24

## 2021-06-24 RX ORDER — DEXTROAMPHETAMINE SACCHARATE, AMPHETAMINE ASPARTATE MONOHYDRATE, DEXTROAMPHETAMINE SULFATE AND AMPHETAMINE SULFATE 5; 5; 5; 5 MG/1; MG/1; MG/1; MG/1
20 CAPSULE, EXTENDED RELEASE ORAL DAILY
Qty: 30 CAPSULE | Refills: 0 | Status: SHIPPED | OUTPATIENT
Start: 2021-06-24 | End: 2021-07-24

## 2021-06-24 RX ORDER — DEXTROAMPHETAMINE SACCHARATE, AMPHETAMINE ASPARTATE MONOHYDRATE, DEXTROAMPHETAMINE SULFATE AND AMPHETAMINE SULFATE 5; 5; 5; 5 MG/1; MG/1; MG/1; MG/1
20 CAPSULE, EXTENDED RELEASE ORAL DAILY
Qty: 30 CAPSULE | Refills: 0 | Status: SHIPPED | OUTPATIENT
Start: 2021-07-25 | End: 2021-08-24

## 2021-06-24 RX ORDER — BUPROPION HYDROCHLORIDE 150 MG/1
150 TABLET ORAL EVERY MORNING
Qty: 90 TABLET | Refills: 1 | Status: CANCELLED | OUTPATIENT
Start: 2021-06-24

## 2021-06-24 RX ORDER — DEXTROAMPHETAMINE SACCHARATE, AMPHETAMINE ASPARTATE, DEXTROAMPHETAMINE SULFATE AND AMPHETAMINE SULFATE 2.5; 2.5; 2.5; 2.5 MG/1; MG/1; MG/1; MG/1
10 TABLET ORAL 2 TIMES DAILY
Qty: 60 TABLET | Refills: 0 | Status: SHIPPED | OUTPATIENT
Start: 2021-07-25 | End: 2021-08-24

## 2021-06-24 RX ORDER — DEXTROAMPHETAMINE SACCHARATE, AMPHETAMINE ASPARTATE, DEXTROAMPHETAMINE SULFATE AND AMPHETAMINE SULFATE 2.5; 2.5; 2.5; 2.5 MG/1; MG/1; MG/1; MG/1
10 TABLET ORAL 2 TIMES DAILY
Qty: 60 TABLET | Refills: 0 | Status: SHIPPED | OUTPATIENT
Start: 2021-06-24 | End: 2021-07-24

## 2021-06-24 RX ORDER — DEXTROAMPHETAMINE SACCHARATE, AMPHETAMINE ASPARTATE MONOHYDRATE, DEXTROAMPHETAMINE SULFATE AND AMPHETAMINE SULFATE 5; 5; 5; 5 MG/1; MG/1; MG/1; MG/1
20 CAPSULE, EXTENDED RELEASE ORAL DAILY
Qty: 30 CAPSULE | Refills: 0 | Status: SHIPPED | OUTPATIENT
Start: 2021-08-25 | End: 2021-09-24

## 2021-06-24 RX ORDER — DEXTROAMPHETAMINE SACCHARATE, AMPHETAMINE ASPARTATE MONOHYDRATE, DEXTROAMPHETAMINE SULFATE AND AMPHETAMINE SULFATE 5; 5; 5; 5 MG/1; MG/1; MG/1; MG/1
CAPSULE, EXTENDED RELEASE ORAL
Qty: 30 CAPSULE | Refills: 0 | Status: CANCELLED | OUTPATIENT
Start: 2021-06-24

## 2021-06-24 RX ORDER — DEXTROAMPHETAMINE SACCHARATE, AMPHETAMINE ASPARTATE, DEXTROAMPHETAMINE SULFATE AND AMPHETAMINE SULFATE 2.5; 2.5; 2.5; 2.5 MG/1; MG/1; MG/1; MG/1
10 TABLET ORAL DAILY
Qty: 25 TABLET | Refills: 0 | Status: CANCELLED | OUTPATIENT
Start: 2021-06-24

## 2021-06-25 NOTE — TELEPHONE ENCOUNTER
LM for patient to return phone call to clinic about message below.  Mary Preciado CMA ([facility])

## 2021-08-04 DIAGNOSIS — Z91.030 BEE STING ALLERGY: ICD-10-CM

## 2021-08-04 RX ORDER — EPINEPHRINE 0.3 MG/.3ML
0.3 INJECTION SUBCUTANEOUS ONCE
Qty: 2 EACH | Refills: 2 | Status: SHIPPED | OUTPATIENT
Start: 2021-08-04 | End: 2021-08-04

## 2021-08-04 NOTE — TELEPHONE ENCOUNTER
Clinic Action Needed:Yes, Please call patient at [phone] (home)     Reason for Call:  Patient requesting Epi Pen refill as soon as possible.  Stating she has a nest of bees in back yard and is sure she will get stung. Denies any current symptoms or sting.  Stating she would like prescription to pharmacy ASAP.    Anaphylaxis Kits Protocol Duhtaw0708/04/2021 01:10 PM   Recent (12 mo) or future (30 days) visit witin the authorizing provider's specialty Protocol Details    Patient is age 5 or older     Medication is active on med list      Per 7/2/21 refill notes state  Should make sure to discuss with Dr. Lao at next visit for future refills.     Sharita Nguyen RN  [facility]

## 2021-10-23 ENCOUNTER — HEALTH MAINTENANCE LETTER (OUTPATIENT)
Age: 56
End: 2021-10-23

## 2022-02-12 ENCOUNTER — HEALTH MAINTENANCE LETTER (OUTPATIENT)
Age: 57
End: 2022-02-12

## 2022-10-09 ENCOUNTER — HEALTH MAINTENANCE LETTER (OUTPATIENT)
Age: 57
End: 2022-10-09

## 2022-11-26 ENCOUNTER — HEALTH MAINTENANCE LETTER (OUTPATIENT)
Age: 57
End: 2022-11-26

## 2023-02-18 ENCOUNTER — HEALTH MAINTENANCE LETTER (OUTPATIENT)
Age: 58
End: 2023-02-18

## 2024-03-16 ENCOUNTER — HEALTH MAINTENANCE LETTER (OUTPATIENT)
Age: 59
End: 2024-03-16

## 2024-08-06 ENCOUNTER — TRANSFERRED RECORDS (OUTPATIENT)
Dept: HEALTH INFORMATION MANAGEMENT | Facility: CLINIC | Age: 59
End: 2024-08-06
Payer: COMMERCIAL

## 2024-09-04 ENCOUNTER — OFFICE VISIT (OUTPATIENT)
Dept: FAMILY MEDICINE | Facility: OTHER | Age: 59
End: 2024-09-04
Payer: COMMERCIAL

## 2024-09-04 ENCOUNTER — ORDERS ONLY (AUTO-RELEASED) (OUTPATIENT)
Dept: FAMILY MEDICINE | Facility: OTHER | Age: 59
End: 2024-09-04

## 2024-09-04 VITALS
TEMPERATURE: 98.2 F | BODY MASS INDEX: 28.85 KG/M2 | SYSTOLIC BLOOD PRESSURE: 118 MMHG | HEART RATE: 88 BPM | DIASTOLIC BLOOD PRESSURE: 82 MMHG | RESPIRATION RATE: 18 BRPM | HEIGHT: 64 IN | WEIGHT: 169 LBS | OXYGEN SATURATION: 96 %

## 2024-09-04 DIAGNOSIS — Z12.11 SCREEN FOR COLON CANCER: ICD-10-CM

## 2024-09-04 DIAGNOSIS — E66.3 OVERWEIGHT (BMI 25.0-29.9): ICD-10-CM

## 2024-09-04 DIAGNOSIS — Z00.00 ROUTINE GENERAL MEDICAL EXAMINATION AT A HEALTH CARE FACILITY: Primary | ICD-10-CM

## 2024-09-04 DIAGNOSIS — L20.82 FLEXURAL ECZEMA: ICD-10-CM

## 2024-09-04 DIAGNOSIS — F90.2 ATTENTION DEFICIT HYPERACTIVITY DISORDER (ADHD), COMBINED TYPE: ICD-10-CM

## 2024-09-04 DIAGNOSIS — Z12.31 VISIT FOR SCREENING MAMMOGRAM: ICD-10-CM

## 2024-09-04 LAB
ALBUMIN SERPL BCG-MCNC: 4.7 G/DL (ref 3.5–5.2)
ALP SERPL-CCNC: 41 U/L (ref 40–150)
ALT SERPL W P-5'-P-CCNC: 21 U/L (ref 0–50)
AMPHETAMINES UR QL SCN: NORMAL
ANION GAP SERPL CALCULATED.3IONS-SCNC: 12 MMOL/L (ref 7–15)
AST SERPL W P-5'-P-CCNC: 24 U/L (ref 0–45)
BARBITURATES UR QL SCN: NORMAL
BENZODIAZ UR QL SCN: NORMAL
BILIRUB SERPL-MCNC: 0.2 MG/DL
BUN SERPL-MCNC: 6.4 MG/DL (ref 8–23)
BZE UR QL SCN: NORMAL
CALCIUM SERPL-MCNC: 9.5 MG/DL (ref 8.8–10.4)
CANNABINOIDS UR QL SCN: NORMAL
CHLORIDE SERPL-SCNC: 99 MMOL/L (ref 98–107)
CHOLEST SERPL-MCNC: 187 MG/DL
CREAT SERPL-MCNC: 0.8 MG/DL (ref 0.51–0.95)
EGFRCR SERPLBLD CKD-EPI 2021: 83 ML/MIN/1.73M2
FASTING STATUS PATIENT QL REPORTED: NO
FASTING STATUS PATIENT QL REPORTED: NO
FENTANYL UR QL: NORMAL
GLUCOSE SERPL-MCNC: 97 MG/DL (ref 70–99)
HCO3 SERPL-SCNC: 27 MMOL/L (ref 22–29)
HDLC SERPL-MCNC: 66 MG/DL
LDLC SERPL CALC-MCNC: 99 MG/DL
NONHDLC SERPL-MCNC: 121 MG/DL
OPIATES UR QL SCN: NORMAL
PCP QUAL URINE (ROCHE): NORMAL
POTASSIUM SERPL-SCNC: 4 MMOL/L (ref 3.4–5.3)
PROT SERPL-MCNC: 7.3 G/DL (ref 6.4–8.3)
SODIUM SERPL-SCNC: 138 MMOL/L (ref 135–145)
TRIGL SERPL-MCNC: 110 MG/DL

## 2024-09-04 PROCEDURE — 99386 PREV VISIT NEW AGE 40-64: CPT | Performed by: FAMILY MEDICINE

## 2024-09-04 PROCEDURE — 80061 LIPID PANEL: CPT | Performed by: FAMILY MEDICINE

## 2024-09-04 PROCEDURE — 80307 DRUG TEST PRSMV CHEM ANLYZR: CPT | Performed by: FAMILY MEDICINE

## 2024-09-04 PROCEDURE — 36415 COLL VENOUS BLD VENIPUNCTURE: CPT | Performed by: FAMILY MEDICINE

## 2024-09-04 PROCEDURE — 99214 OFFICE O/P EST MOD 30 MIN: CPT | Mod: 25 | Performed by: FAMILY MEDICINE

## 2024-09-04 PROCEDURE — 80053 COMPREHEN METABOLIC PANEL: CPT | Performed by: FAMILY MEDICINE

## 2024-09-04 RX ORDER — DEXTROAMPHETAMINE SACCHARATE, AMPHETAMINE ASPARTATE MONOHYDRATE, DEXTROAMPHETAMINE SULFATE AND AMPHETAMINE SULFATE 5; 5; 5; 5 MG/1; MG/1; MG/1; MG/1
CAPSULE, EXTENDED RELEASE ORAL
Qty: 30 CAPSULE | Refills: 0 | Status: SHIPPED | OUTPATIENT
Start: 2024-09-04 | End: 2024-09-04

## 2024-09-04 RX ORDER — DEXTROAMPHETAMINE SACCHARATE, AMPHETAMINE ASPARTATE MONOHYDRATE, DEXTROAMPHETAMINE SULFATE AND AMPHETAMINE SULFATE 5; 5; 5; 5 MG/1; MG/1; MG/1; MG/1
CAPSULE, EXTENDED RELEASE ORAL
Qty: 30 CAPSULE | Refills: 0 | Status: SHIPPED | OUTPATIENT
Start: 2024-09-04

## 2024-09-04 RX ORDER — DEXTROAMPHETAMINE SACCHARATE, AMPHETAMINE ASPARTATE, DEXTROAMPHETAMINE SULFATE AND AMPHETAMINE SULFATE 2.5; 2.5; 2.5; 2.5 MG/1; MG/1; MG/1; MG/1
10 TABLET ORAL DAILY
Qty: 25 TABLET | Refills: 0 | Status: SHIPPED | OUTPATIENT
Start: 2024-09-04 | End: 2024-09-04

## 2024-09-04 RX ORDER — DEXTROAMPHETAMINE SACCHARATE, AMPHETAMINE ASPARTATE, DEXTROAMPHETAMINE SULFATE AND AMPHETAMINE SULFATE 2.5; 2.5; 2.5; 2.5 MG/1; MG/1; MG/1; MG/1
10 TABLET ORAL DAILY
Qty: 30 TABLET | Refills: 0 | Status: SHIPPED | OUTPATIENT
Start: 2024-09-04

## 2024-09-04 ASSESSMENT — PAIN SCALES - GENERAL: PAINLEVEL: NO PAIN (0)

## 2024-09-04 NOTE — PATIENT INSTRUCTIONS
Patient Education   Preventive Care Advice   This is general advice given by our system to help you stay healthy. However, your care team may have specific advice just for you. Please talk to your care team about your preventive care needs.  Nutrition  Eat 5 or more servings of fruits and vegetables each day.  Try wheat bread, brown rice and whole grain pasta (instead of white bread, rice, and pasta).  Get enough calcium and vitamin D. Check the label on foods and aim for 100% of the RDA (recommended daily allowance).  Lifestyle  Exercise at least 150 minutes each week  (30 minutes a day, 5 days a week).  Do muscle strengthening activities 2 days a week. These help control your weight and prevent disease.  No smoking.  Wear sunscreen to prevent skin cancer.  Have a dental exam and cleaning every 6 months.  Yearly exams  See your health care team every year to talk about:  Any changes in your health.  Any medicines your care team has prescribed.  Preventive care, family planning, and ways to prevent chronic diseases.  Shots (vaccines)   HPV shots (up to age 26), if you've never had them before.  Hepatitis B shots (up to age 59), if you've never had them before.  COVID-19 shot: Get this shot when it's due.  Flu shot: Get a flu shot every year.  Tetanus shot: Get a tetanus shot every 10 years.  Pneumococcal, hepatitis A, and RSV shots: Ask your care team if you need these based on your risk.  Shingles shot (for age 50 and up)  General health tests  Diabetes screening:  Starting at age 35, Get screened for diabetes at least every 3 years.  If you are younger than age 35, ask your care team if you should be screened for diabetes.  Cholesterol test: At age 39, start having a cholesterol test every 5 years, or more often if advised.  Bone density scan (DEXA): At age 50, ask your care team if you should have this scan for osteoporosis (brittle bones).  Hepatitis C: Get tested at least once in your life.  STIs (sexually  transmitted infections)  Before age 24: Ask your care team if you should be screened for STIs.  After age 24: Get screened for STIs if you're at risk. You are at risk for STIs (including HIV) if:  You are sexually active with more than one person.  You don't use condoms every time.  You or a partner was diagnosed with a sexually transmitted infection.  If you are at risk for HIV, ask about PrEP medicine to prevent HIV.  Get tested for HIV at least once in your life, whether you are at risk for HIV or not.  Cancer screening tests  Cervical cancer screening: If you have a cervix, begin getting regular cervical cancer screening tests starting at age 21.  Breast cancer scan (mammogram): If you've ever had breasts, begin having regular mammograms starting at age 40. This is a scan to check for breast cancer.  Colon cancer screening: It is important to start screening for colon cancer at age 45.  Have a colonoscopy test every 10 years (or more often if you're at risk) Or, ask your provider about stool tests like a FIT test every year or Cologuard test every 3 years.  To learn more about your testing options, visit:   .  For help making a decision, visit:   https://bit.ly/kr83765.  Prostate cancer screening test: If you have a prostate, ask your care team if a prostate cancer screening test (PSA) at age 55 is right for you.  Lung cancer screening: If you are a current or former smoker ages 50 to 80, ask your care team if ongoing lung cancer screenings are right for you.  For informational purposes only. Not to replace the advice of your health care provider. Copyright   2023 [facility]. All rights reserved. Clinically reviewed by the [facility] Transitions Program. Morgan Everett [id number] - REV 01/24.  9 Ways to Cut Back on Drinking  Maybe you've found yourself drinking more alcohol than you'd prefer. If you want to cut back, here are some ideas to try.    Think before you drink.  Do you really want a drink,  "or is it just a habit? If you're used to having a drink at a certain time, try doing something else then.     Look for substitutes.  Find some no-alcohol drinks that you enjoy, like flavored seltzer water, tea with honey, or tonic with a slice of lime. Or try alcohol-free beer or \"virgin\" cocktails (without the alcohol).     Drink more water.  Use water to quench your thirst. Drink a glass of water before you have any alcohol. Have another glass along with every drink or between drinks.     Shrink your drink.  For example, have a bottle of beer instead of a pint. Use a smaller glass for wine. Choose drinks with lower alcohol content (ABV%). Or use less liquor and more mixer in cocktails.     Slow down.  It's easy to drink quickly and without thinking about it. Pay attention, and make each drink last longer.     Do the math.  Total up how much you spend on alcohol each month. How much is that a year? If you cut back, what could you do with the money you save?     Take a break.  Choose a day or two each week when you won't drink at all. Notice how you feel on those days, physically and emotionally. How did you sleep? Do you feel better? Over time, add more break days.     Count calories.  Would you like to lose some weight? For some people that's a good motivator for cutting back. Figure out how many calories are in each drink. How many does that add up to in a day? In a week? In a month?     Practice saying no.  Be ready when someone offers you a drink. Try: \"Thanks, I've had enough.\" Or \"Thanks, but I'm cutting back.\" Or \"No, thanks. I feel better when I drink less.\"   Current as of: November 15, 2023  Content Version: 14.1 2006-2024 [employer].   Care instructions adapted under license by your healthcare professional. If you have questions about a medical condition or this instruction, always ask your healthcare professional. [employer] disclaims any warranty or liability for your use " of this information.

## 2024-09-04 NOTE — PROGRESS NOTES
Preventive Care Visit  [facility]  More Lao MD, MD, Family Medicine  Sep 4, 2024      Assessment & Plan         ICD-10-CM    1. Routine general medical examination at a health care facility  Z00.00       2. Flexural eczema  L20.82       3. Attention deficit hyperactivity disorder (ADHD), combined type  F90.2 Urine Drug Screen     amphetamine-dextroamphetamine (ADDERALL XR) 20 MG 24 hr capsule     amphetamine-dextroamphetamine (ADDERALL) 10 MG tablet     Urine Drug Screen     DISCONTINUED: amphetamine-dextroamphetamine (ADDERALL XR) 20 MG 24 hr capsule     DISCONTINUED: amphetamine-dextroamphetamine (ADDERALL) 10 MG tablet      4. Overweight (BMI 25.0-29.9)  E66.3 Lipid panel reflex to direct LDL Non-fasting     Comprehensive metabolic panel (BMP + Alb, Alk Phos, ALT, AST, Total. Bili, TP)     Lipid panel reflex to direct LDL Non-fasting     Comprehensive metabolic panel (BMP + Alb, Alk Phos, ALT, AST, Total. Bili, TP)      5. Screen for colon cancer  Z12.11 CHANEL([employer])      6. Visit for screening mammogram  Z12.31 MA Screening Bilateral w/ [facility]          Patient has ADHD for years but has not been taking medications recently as she had been trying to make do without these.  She is having more troubles with focus and concentration and has family and friends asking if she consider getting back on her medications.  She would like to try this again and asked for renewal of these.  We did go through the contract and the drug screen today although she did forget [employer] the contractor for she left and so we will catch this at her next visit.  She did have weight gain since she had stopped this medication and she has been taking Ozempic from [place] without response.  We did discuss the pros and cons of Ozempic and then indicated that the side effects of her ADHD medications were decrease in appetite and that her weight gain occurred during the time when she had stopped her ADHD  "medications so we will restart these first and determine her weight and response before we make any discussions further over Ozempic.  Although because she is not a diabetic we would have to change her to Wegovy or Zepbound and we did talk about the cost differences with these medications.  Because she would like to be prepared for this we did talk about labs and she will get these all done today.  Also she is noticing some Flexeril eczema of her hands which is been getting worse over time but she does admit to very hot water for washing her hands which can be making this much drier.  We talked about eczema wraps and wet therapy to try and help as emollients are likely the best choice for her plus reducing her warm water use.  She also has a history of clenching her jaw and has gotten Botox injections in her jaw and was asking if we would do these which we do not currently do these and so we did refer her to the TMJ clinic though she did have a previous provider who had given her Botox injection she would prefer to return to first and will establish there.    No LOS data to display   Time spent by me doing chart review, history and exam, documentation and further activities per the note    More Lao MD     Patient has been advised of split billing requirements and indicates understanding: Yes        BMI  Estimated body mass index is 29.25 kg/m  as calculated from the following:    Height as of this encounter: 1.619 m (5' 3.74\").    Weight as of this encounter: 76.7 kg (169 lb).   Weight management plan: Discussed healthy diet and exercise guidelines    Counseling  Appropriate preventive services were addressed with this patient via screening, questionnaire, or discussion as appropriate for fall prevention, nutrition, physical activity, Tobacco-use cessation, social engagement, weight loss and cognition.  Checklist reviewing preventive services available has been given to the patient.  Reviewed patient's diet, " addressing concerns and/or questions.   The patient was instructed to see the dentist every 6 months.   She is at risk for psychosocial distress and has been provided with information to reduce risk.   The patient reports drinking more than 3 alcoholic drinks per day and/or more than 7 drhnks per week. The patient was counseled and given information about possible harmful effects of excessive alcohol intake.        Abdulkadir Solis is a 59 year old, presenting for the following:  Physical        9/4/2024     1:26 PM   Additional Questions   Roomed by damon   Accompanied by alone         9/4/2024     1:26 PM   Patient Reported Additional Medications   Patient reports taking the following new medications ozempic for [place]        Health Care Directive  Patient does not have a Health Care Directive or Living Will: Patient states has Advance Directive and will bring in a copy to clinic.    HPI  TMJ< ADHD, overweight, clenched jaw.             9/4/2024   General Health   How would you rate your overall physical health? Good   Feel stress (tense, anxious, or unable to sleep) Only a little      (!) STRESS CONCERN      9/4/2024   Nutrition   Three or more servings of calcium each day? (!) I DON'T KNOW   Diet: Regular (no restrictions)   How many servings of fruit and vegetables per day? (!) 0-1   How many sweetened beverages each day? 0-1            9/4/2024   Exercise   Days per week of moderate/strenous exercise 4 days   Average minutes spent exercising at this level 40 min            9/4/2024   Social Factors   Frequency of gathering with friends or relatives Three times a week   Worry food won't last until get money to buy more No   Food not last or not have enough money for food? No   Do you have housing? (Housing is defined as stable permanent housing and does not include staying ouside in a car, in a tent, in an abandoned building, in an overnight shelter, or couch-surfing.) Yes   Are you worried about losing your  housing? No   Lack of transportation? No   Unable to get utilities (heat,electricity)? No            9/4/2024   Fall Risk   Fallen 2 or more times in the past year? No   Trouble with walking or balance? No             9/4/2024   Dental   Dentist two times every year? (!) NO            9/4/2024   TB Screening   Were you born outside of the [place]? No            Today's PHQ-2 Score:       9/4/2024     1:27 PM   PHQ-2 ( 1999 [employer])   Q1: Little interest or pleasure in doing things 0   Q2: Feeling down, depressed or hopeless 0   PHQ-2 Score 0   Q1: Little interest or pleasure in doing things Not at all   Q2: Feeling down, depressed or hopeless Not at all   PHQ-2 Score 0           9/4/2024   Substance Use   Alcohol more than 3/day or more than 7/wk Yes   How often do you have a drink containing alcohol 2 to 3 times a week   How many alcohol drinks on typical day 1 or 2   How often do you have 5+ drinks at one occasion Less than monthly   Audit 2/3 Score 1   How often not able to stop drinking once started Never   How often failed to do what normally expected Never   How often needed first drink in am after a heavy drinking session Never   How often feeling of guilt or remorse after drinking Never   How often unable to remember what happened the night before Never   Have you or someone else been injured because of your drinking No   Has anyone been concerned or suggested you cut down on drinking No   TOTAL SCORE - AUDIT 4   Do you use any other substances recreationally? No        Social History     Tobacco Use    Smoking status: Former     Types: Cigarettes    Smokeless tobacco: Never    Tobacco comments:     1 pack per week. Quit about 2 years ago- 9/4/2024   Vaping Use    Vaping status: Some Days   Substance Use Topics    Alcohol use: Yes     Comment: weekly    Drug use: No          Mammogram Screening - Mammogram every 1-2 years updated in Health Maintenance based on mutual decision making        9/4/2024   STI Screening  "  New sexual partner(s) since last STI/HIV test? No        History of abnormal Pap smear: No - age 30- 64 PAP with HPV every 5 years recommended        2/9/2011    12:00 AM 9/16/2008    12:00 AM 8/14/2006    12:00 AM   PAP / HPV   PAP (Historical) OTHER-NIL EM>40  OTHER-NIL EM>40  NIL      ASCVD Risk   The 10-year ASCVD risk score (Sunday DILL, et al., 2019) is: 2.8%    Values used to calculate the score:      Age: 59 years      Sex: Female      Is Non- : No      Diabetic: No      Tobacco smoker: No      Systolic Blood Pressure: 118 mmHg      Is BP treated: No      HDL Cholesterol: 78 mg/dL      Total Cholesterol: 227 mg/dL           Reviewed and updated as needed this visit by Provider   Tobacco  Allergies  Meds  Problems  Med Hx  Surg Hx  Fam Hx                  Review of Systems  Constitutional, HEENT, cardiovascular, pulmonary, GI, , musculoskeletal, neuro, skin, endocrine and psych systems are negative, except as otherwise noted.     Objective    Exam  /82   Pulse 88   Temp 98.2  F (36.8  C) (Temporal)   Resp 18   Ht 1.619 m (5' 3.74\")   Wt 76.7 kg (169 lb)   LMP 03/07/2011   SpO2 96%   BMI 29.25 kg/m     Estimated body mass index is 29.25 kg/m  as calculated from the following:    Height as of this encounter: 1.619 m (5' 3.74\").    Weight as of this encounter: 76.7 kg (169 lb).    Physical Exam  GENERAL: alert and no distress  RESP: lungs clear to auscultation - no rales, rhonchi or wheezes  CV: regular rate and rhythm, normal S1 S2, no S3 or S4, no murmur, click or rub, no peripheral edema  ABDOMEN: soft, nontender, no hepatosplenomegaly, no masses and bowel sounds normal  MS: no gross musculoskeletal defects noted, no edema  SKIN: peeling, dry, cracked hands, mostly on fingers  NEURO: Normal strength and tone, mentation intact and speech normal  PSYCH: mentation appears normal and affect normal/bright        Signed Electronically by: More Lao MD, MD    "

## 2024-09-04 NOTE — LETTER
[facility] RAMYA Orting  09/04/24  Patient: Chelo Lopez  YOB: 1965  Medical Record Number: [id number]                                                                                  Non-Opioid Controlled Substance Agreement    This is an agreement between you and your provider regarding safe and appropriate use of controlled substances prescribed by your care team. Controlled substances are?medicines that can cause physical and mental dependence (abuse).     There are strict laws about having and using these medicines. We here at [facility] are  committed to working with you in your efforts to get better. To support you in this work, we'll help you schedule regular office appointments for medicine refills. If we must cancel or change your appointment for any reason, we'll make sure you have enough medicine to last until your next appointment.     As a Provider, I will:   Listen carefully to your concerns while treating you with respect.   Recommend a treatment plan that I believe is in your best interest and may involve therapies other than medicine.    Talk with you often about the possible benefits and the risk of harm of any medicine that we prescribe for you.  Assess the safety of this medicine and check how well it works.    Provide a plan on how to taper (discontinue or go off) using this medicine if the decision is made to stop its use.      ::  As a Patient, I understand controlled substances:     Are prescribed by my care provider to help me function or work and manage my condition(s).?  Are strong medicines and can cause serious side effects.     Need to be taken exactly as prescribed.?Combining controlled substances with certain medicines or chemicals (such as illegal drugs, alcohol, sedatives, sleeping pills, and benzodiazepines) can be dangerous or even fatal.? If I stop taking my medicines suddenly, I may have severe withdrawal symptoms.     The risks, benefits,  and side effects of these medicine(s) were explained to me. I agree that:    I will take part in other treatments as advised by my care team. This may be psychiatry or counseling, physical therapy, behavioral therapy, group treatment or a referral to specialist.    I will keep all my appointments and understand this is part of the monitoring of controlled substances.?My care team may require an office visit for EVERY controlled substance refill. If I miss appointments or don t follow instructions, my care team may stop my medicine    I will take my medicines as prescribed. I will not change the dose or schedule unless my care team tells me to. There will be no refills if I run out early.      I may be asked to come to the clinic and complete a urine drug test or complete a pill count. If I don t give a urine sample or participate in a pill count, the care team may stop my medicine.    I will only receive controlled substance prescriptions from this clinic. If I am treated by another provider, I will tell them that I am taking controlled substances and that I have a treatment agreement with this provider. I will inform my [facility] care team within one business day if I am given a prescription for any controlled substance by another healthcare provider. My [facility] care team can contact other providers and pharmacists about my use of any medicines.    It is up to me to make sure that I don't run out of my medicines on weekends or holidays.?If my care team is willing to refill my prescription without a visit, I must request refills only during office hours. Refills may take up to 3 business days to process. I will use one pharmacy to fill all my controlled substance prescriptions. I will notify the clinic about any changes to my insurance or medicine availability.    I am responsible for my prescriptions. If the medicine/prescription is lost, stolen or destroyed, it will not be replaced.?I also agree  not to share controlled substance medicines with anyone.     I am aware I should not use any illegal or recreational drugs. I agree not to drink alcohol unless my care team says I can.     If I enroll in the [place] Medical Cannabis program, I will tell my care team before my next refill.    I will tell my care team right away if I become pregnant, have a new medical problem treated outside of my regular clinic, or have a change in my medicines.     I understand that this medicine can affect my thinking, judgment and reaction time.? Alcohol and drugs affect the brain and body, which can affect the safety of my driving. Being under the influence of alcohol or drugs can affect my decision-making, behaviors, personal safety and the safety of others. Driving while impaired (DWI) can occur if a person is driving, operating or in physical control of a car, motorcycle, boat, snowmobile, ATV, motorbike, off-road vehicle or any other motor vehicle ([place] Statute 169A.20). I understand the risk if I choose to drive or operate any vehicle or machinery.    I understand that if I do not follow any of the conditions above, my prescriptions or treatment may be stopped or changed.   I agree that my provider, clinic care team and pharmacy may work with any city, state or federal law enforcement agency that investigates the misuse, sale or other diversion of my controlled medicine. I will allow my provider to discuss my care with, or share a copy of, this agreement with any other treating provider, pharmacy or emergency room where I receive care.     I have read this agreement and have asked questions about anything I did not understand.    ________________________________________________________  Patient Signature - Chelo Lopez     ___________________                   Date     ________________________________________________________  Provider Signature - More Lao MD, MD       ___________________                   Date      ________________________________________________________  Witness Signature (required if provider not present while patient signing)          ___________________                   Date

## 2024-09-23 LAB — NONINV COLON CA DNA+OCC BLD SCRN STL QL: NEGATIVE

## 2024-10-02 ENCOUNTER — OFFICE VISIT (OUTPATIENT)
Dept: FAMILY MEDICINE | Facility: OTHER | Age: 59
End: 2024-10-02
Payer: COMMERCIAL

## 2024-10-02 VITALS
WEIGHT: 165 LBS | OXYGEN SATURATION: 97 % | RESPIRATION RATE: 16 BRPM | HEART RATE: 92 BPM | TEMPERATURE: 98.8 F | SYSTOLIC BLOOD PRESSURE: 120 MMHG | BODY MASS INDEX: 28.55 KG/M2 | DIASTOLIC BLOOD PRESSURE: 80 MMHG

## 2024-10-02 DIAGNOSIS — F90.0 ATTENTION DEFICIT HYPERACTIVITY DISORDER (ADHD), PREDOMINANTLY INATTENTIVE TYPE: Primary | ICD-10-CM

## 2024-10-02 PROCEDURE — 99213 OFFICE O/P EST LOW 20 MIN: CPT | Performed by: FAMILY MEDICINE

## 2024-10-02 RX ORDER — DEXTROAMPHETAMINE SACCHARATE, AMPHETAMINE ASPARTATE, DEXTROAMPHETAMINE SULFATE AND AMPHETAMINE SULFATE 2.5; 2.5; 2.5; 2.5 MG/1; MG/1; MG/1; MG/1
10 TABLET ORAL DAILY
Qty: 30 TABLET | Refills: 0 | Status: SHIPPED | OUTPATIENT
Start: 2024-11-01 | End: 2024-12-01

## 2024-10-02 RX ORDER — DEXTROAMPHETAMINE SACCHARATE, AMPHETAMINE ASPARTATE MONOHYDRATE, DEXTROAMPHETAMINE SULFATE AND AMPHETAMINE SULFATE 7.5; 7.5; 7.5; 7.5 MG/1; MG/1; MG/1; MG/1
30 CAPSULE, EXTENDED RELEASE ORAL DAILY
Qty: 30 CAPSULE | Refills: 0 | Status: SHIPPED | OUTPATIENT
Start: 2024-11-01 | End: 2024-12-01

## 2024-10-02 RX ORDER — DEXTROAMPHETAMINE SACCHARATE, AMPHETAMINE ASPARTATE MONOHYDRATE, DEXTROAMPHETAMINE SULFATE AND AMPHETAMINE SULFATE 7.5; 7.5; 7.5; 7.5 MG/1; MG/1; MG/1; MG/1
30 CAPSULE, EXTENDED RELEASE ORAL DAILY
Qty: 30 CAPSULE | Refills: 0 | Status: SHIPPED | OUTPATIENT
Start: 2024-12-01 | End: 2024-12-31

## 2024-10-02 RX ORDER — DEXTROAMPHETAMINE SACCHARATE, AMPHETAMINE ASPARTATE, DEXTROAMPHETAMINE SULFATE AND AMPHETAMINE SULFATE 2.5; 2.5; 2.5; 2.5 MG/1; MG/1; MG/1; MG/1
10 TABLET ORAL DAILY
Qty: 30 TABLET | Refills: 0 | Status: SHIPPED | OUTPATIENT
Start: 2024-10-02 | End: 2024-11-01

## 2024-10-02 RX ORDER — DEXTROAMPHETAMINE SACCHARATE, AMPHETAMINE ASPARTATE MONOHYDRATE, DEXTROAMPHETAMINE SULFATE AND AMPHETAMINE SULFATE 7.5; 7.5; 7.5; 7.5 MG/1; MG/1; MG/1; MG/1
30 CAPSULE, EXTENDED RELEASE ORAL DAILY
Qty: 30 CAPSULE | Refills: 0 | Status: SHIPPED | OUTPATIENT
Start: 2024-10-02 | End: 2024-11-01

## 2024-10-02 RX ORDER — DEXTROAMPHETAMINE SACCHARATE, AMPHETAMINE ASPARTATE, DEXTROAMPHETAMINE SULFATE AND AMPHETAMINE SULFATE 2.5; 2.5; 2.5; 2.5 MG/1; MG/1; MG/1; MG/1
10 TABLET ORAL DAILY
Qty: 30 TABLET | Refills: 0 | Status: SHIPPED | OUTPATIENT
Start: 2024-12-01 | End: 2024-12-31

## 2024-10-02 ASSESSMENT — PAIN SCALES - GENERAL: PAINLEVEL: NO PAIN (0)

## 2024-10-02 NOTE — PROGRESS NOTES
Assessment & Plan         ICD-10-CM    1. Attention deficit hyperactivity disorder (ADHD), predominantly inattentive type  F90.0 amphetamine-dextroamphetamine (ADDERALL XR) 30 MG 24 hr capsule     amphetamine-dextroamphetamine (ADDERALL XR) 30 MG 24 hr capsule     amphetamine-dextroamphetamine (ADDERALL XR) 30 MG 24 hr capsule     amphetamine-dextroamphetamine (ADDERALL) 10 MG tablet     amphetamine-dextroamphetamine (ADDERALL) 10 MG tablet     amphetamine-dextroamphetamine (ADDERALL) 10 MG tablet          Doing quite well on her new medications and feels comfortable with maintaining this at the current dose.  She did not have significant increase in her blood pressure either though we did talk about given her age that this may occur over time and oftentimes will move away from stimulant use if it continues and so we did review lifestyle changes to maintain blood pressures as well    No LOS data to display   Time spent by me doing chart review, history and exam, documentation and further activities per the note    More Lao MD                 Abdulkadir Solis is a 59 year old, presenting for the following health issues:  A.D.H.D        10/2/2024     2:55 PM   Additional Questions   Roomed by damon   Accompanied by alone         10/2/2024     2:55 PM   Patient Reported Additional Medications   Patient reports taking the following new medications none     History of Present Illness       Reason for visit:  Follow up    She eats 0-1 servings of fruits and vegetables daily.She consumes 0 sweetened beverage(s) daily.She exercises with enough effort to increase her heart rate 20 to 29 minutes per day.  She exercises with enough effort to increase her heart rate 4 days per week.   She is taking medications regularly.     SUBJECTIVE:  Chelo is here today to recheck ADHD/ADD.    Updates since last visit: better    Routine for taking medicine, including time: 9am and 3pm  Time medicine wears off: 3pm and 10pm  Issues  at school/work: none  Issues at home: none  Control of symptoms: yes    Side effects:  Headaches: No  Stomach aches: No  Irritability/mood swings: No  Difficulties with sleep: No  Social withdrawal: No  Unusual movements/tics: No  Decreased appetite: No    Other concerns: none          Review of Systems  Constitutional, HEENT, cardiovascular, pulmonary, GI, , musculoskeletal, neuro, skin, endocrine and psych systems are negative, except as otherwise noted.      Objective    /80   Pulse 92   Temp 98.8  F (37.1  C) (Temporal)   Resp 16   Wt 74.8 kg (165 lb)   LMP 03/07/2011   SpO2 97%   BMI 28.55 kg/m    Body mass index is 28.55 kg/m .  Physical Exam   GENERAL: alert and no distress  RESP: lungs clear to auscultation - no rales, rhonchi or wheezes  CV: regular rate and rhythm, normal S1 S2, no S3 or S4, no murmur, click or rub, no peripheral edema  ABDOMEN: soft, nontender, no hepatosplenomegaly, no masses and bowel sounds normal  MS: no gross musculoskeletal defects noted, no edema  SKIN: no suspicious lesions or rashes  NEURO: Normal strength and tone, mentation intact and speech normal  PSYCH: mentation appears normal, affect normal/bright            Signed Electronically by: More Lao MD, MD

## 2024-10-29 ENCOUNTER — ANCILLARY PROCEDURE (OUTPATIENT)
Dept: MAMMOGRAPHY | Facility: OTHER | Age: 59
End: 2024-10-29
Attending: FAMILY MEDICINE
Payer: COMMERCIAL

## 2024-10-29 DIAGNOSIS — Z12.31 VISIT FOR SCREENING MAMMOGRAM: ICD-10-CM

## 2024-10-29 DIAGNOSIS — F90.0 ATTENTION DEFICIT HYPERACTIVITY DISORDER (ADHD), PREDOMINANTLY INATTENTIVE TYPE: ICD-10-CM

## 2024-10-29 DIAGNOSIS — F90.2 ATTENTION DEFICIT HYPERACTIVITY DISORDER (ADHD), COMBINED TYPE: ICD-10-CM

## 2024-10-29 PROCEDURE — 77063 BREAST TOMOSYNTHESIS BI: CPT | Mod: TC | Performed by: RADIOLOGY

## 2024-10-29 PROCEDURE — 77067 SCR MAMMO BI INCL CAD: CPT | Mod: TC | Performed by: RADIOLOGY

## 2024-10-29 RX ORDER — DEXTROAMPHETAMINE SACCHARATE, AMPHETAMINE ASPARTATE MONOHYDRATE, DEXTROAMPHETAMINE SULFATE AND AMPHETAMINE SULFATE 7.5; 7.5; 7.5; 7.5 MG/1; MG/1; MG/1; MG/1
30 CAPSULE, EXTENDED RELEASE ORAL DAILY
Qty: 30 CAPSULE | Refills: 0 | OUTPATIENT
Start: 2024-10-29

## 2024-10-29 RX ORDER — DEXTROAMPHETAMINE SACCHARATE, AMPHETAMINE ASPARTATE, DEXTROAMPHETAMINE SULFATE AND AMPHETAMINE SULFATE 2.5; 2.5; 2.5; 2.5 MG/1; MG/1; MG/1; MG/1
10 TABLET ORAL DAILY
Qty: 30 TABLET | Refills: 0 | OUTPATIENT
Start: 2024-10-29

## 2024-10-29 NOTE — TELEPHONE ENCOUNTER
The prescriptions do not need to be signed. Dr. Lao has two Rx in there already for  on 11/1.  Verbal ok for her to pick them up so notify pharmacy BUT she will not be able to pick her next Rx until the date listed on there for December because she still should have enough medication to last her.  Also if she has a CSA with Dr. Lao we do not allow for early refills but she can talk with Dr. Lao about that.     Junior Reynolds PA-C

## 2024-10-29 NOTE — TELEPHONE ENCOUNTER
RN called patient. Pharmacy did verify able to fill this time early per note below. But not able to fill until 12/1 for next fill.  She is not out, just thought she'd try to get it while in town.  Verbalized understanding. Denies any other questions or concerns at this time.

## 2025-01-06 ENCOUNTER — E-VISIT (OUTPATIENT)
Dept: FAMILY MEDICINE | Facility: OTHER | Age: 60
End: 2025-01-06
Payer: COMMERCIAL

## 2025-01-06 ENCOUNTER — MYC REFILL (OUTPATIENT)
Dept: FAMILY MEDICINE | Facility: OTHER | Age: 60
End: 2025-01-06

## 2025-01-06 DIAGNOSIS — F90.2 ATTENTION DEFICIT HYPERACTIVITY DISORDER (ADHD), COMBINED TYPE: ICD-10-CM

## 2025-01-06 DIAGNOSIS — F90.0 ATTENTION DEFICIT HYPERACTIVITY DISORDER (ADHD), PREDOMINANTLY INATTENTIVE TYPE: ICD-10-CM

## 2025-01-06 RX ORDER — DEXTROAMPHETAMINE SACCHARATE, AMPHETAMINE ASPARTATE, DEXTROAMPHETAMINE SULFATE AND AMPHETAMINE SULFATE 2.5; 2.5; 2.5; 2.5 MG/1; MG/1; MG/1; MG/1
10 TABLET ORAL DAILY
Qty: 30 TABLET | Refills: 0 | OUTPATIENT
Start: 2025-01-06

## 2025-01-06 RX ORDER — DEXTROAMPHETAMINE SACCHARATE, AMPHETAMINE ASPARTATE, DEXTROAMPHETAMINE SULFATE AND AMPHETAMINE SULFATE 2.5; 2.5; 2.5; 2.5 MG/1; MG/1; MG/1; MG/1
10 TABLET ORAL DAILY
Qty: 30 TABLET | Refills: 0 | Status: SHIPPED | OUTPATIENT
Start: 2025-02-05 | End: 2025-03-07

## 2025-01-06 RX ORDER — DEXTROAMPHETAMINE SACCHARATE, AMPHETAMINE ASPARTATE MONOHYDRATE, DEXTROAMPHETAMINE SULFATE AND AMPHETAMINE SULFATE 5; 5; 5; 5 MG/1; MG/1; MG/1; MG/1
20 CAPSULE, EXTENDED RELEASE ORAL DAILY
Qty: 30 CAPSULE | Refills: 0 | Status: SHIPPED | OUTPATIENT
Start: 2025-01-06 | End: 2025-02-05

## 2025-01-06 RX ORDER — DEXTROAMPHETAMINE SACCHARATE, AMPHETAMINE ASPARTATE, DEXTROAMPHETAMINE SULFATE AND AMPHETAMINE SULFATE 2.5; 2.5; 2.5; 2.5 MG/1; MG/1; MG/1; MG/1
10 TABLET ORAL DAILY
Qty: 30 TABLET | Refills: 0 | Status: SHIPPED | OUTPATIENT
Start: 2025-03-07 | End: 2025-04-06

## 2025-01-06 RX ORDER — DEXTROAMPHETAMINE SACCHARATE, AMPHETAMINE ASPARTATE MONOHYDRATE, DEXTROAMPHETAMINE SULFATE AND AMPHETAMINE SULFATE 5; 5; 5; 5 MG/1; MG/1; MG/1; MG/1
20 CAPSULE, EXTENDED RELEASE ORAL DAILY
Qty: 30 CAPSULE | Refills: 0 | Status: SHIPPED | OUTPATIENT
Start: 2025-02-05 | End: 2025-03-07

## 2025-01-06 RX ORDER — DEXTROAMPHETAMINE SACCHARATE, AMPHETAMINE ASPARTATE MONOHYDRATE, DEXTROAMPHETAMINE SULFATE AND AMPHETAMINE SULFATE 7.5; 7.5; 7.5; 7.5 MG/1; MG/1; MG/1; MG/1
30 CAPSULE, EXTENDED RELEASE ORAL DAILY
Qty: 30 CAPSULE | Refills: 0 | OUTPATIENT
Start: 2025-01-06

## 2025-01-06 RX ORDER — DEXTROAMPHETAMINE SACCHARATE, AMPHETAMINE ASPARTATE, DEXTROAMPHETAMINE SULFATE AND AMPHETAMINE SULFATE 2.5; 2.5; 2.5; 2.5 MG/1; MG/1; MG/1; MG/1
10 TABLET ORAL DAILY
Qty: 30 TABLET | Refills: 0 | Status: SHIPPED | OUTPATIENT
Start: 2025-01-06 | End: 2025-02-05

## 2025-01-06 RX ORDER — DEXTROAMPHETAMINE SACCHARATE, AMPHETAMINE ASPARTATE MONOHYDRATE, DEXTROAMPHETAMINE SULFATE AND AMPHETAMINE SULFATE 5; 5; 5; 5 MG/1; MG/1; MG/1; MG/1
20 CAPSULE, EXTENDED RELEASE ORAL DAILY
Qty: 30 CAPSULE | Refills: 0 | Status: SHIPPED | OUTPATIENT
Start: 2025-03-07 | End: 2025-04-06

## 2025-01-06 ASSESSMENT — PATIENT HEALTH QUESTIONNAIRE - PHQ9
SUM OF ALL RESPONSES TO PHQ QUESTIONS 1-9: 0
SUM OF ALL RESPONSES TO PHQ QUESTIONS 1-9: 0
10. IF YOU CHECKED OFF ANY PROBLEMS, HOW DIFFICULT HAVE THESE PROBLEMS MADE IT FOR YOU TO DO YOUR WORK, TAKE CARE OF THINGS AT HOME, OR GET ALONG WITH OTHER PEOPLE: NOT DIFFICULT AT ALL

## 2025-01-06 ASSESSMENT — ANXIETY QUESTIONNAIRES
IF YOU CHECKED OFF ANY PROBLEMS ON THIS QUESTIONNAIRE, HOW DIFFICULT HAVE THESE PROBLEMS MADE IT FOR YOU TO DO YOUR WORK, TAKE CARE OF THINGS AT HOME, OR GET ALONG WITH OTHER PEOPLE: NOT DIFFICULT AT ALL
7. FEELING AFRAID AS IF SOMETHING AWFUL MIGHT HAPPEN: NOT AT ALL
GAD7 TOTAL SCORE: 0
6. BECOMING EASILY ANNOYED OR IRRITABLE: NOT AT ALL
GAD7 TOTAL SCORE: 0
1. FEELING NERVOUS, ANXIOUS, OR ON EDGE: NOT AT ALL
5. BEING SO RESTLESS THAT IT IS HARD TO SIT STILL: NOT AT ALL
GAD7 TOTAL SCORE: 0
4. TROUBLE RELAXING: NOT AT ALL
7. FEELING AFRAID AS IF SOMETHING AWFUL MIGHT HAPPEN: NOT AT ALL
2. NOT BEING ABLE TO STOP OR CONTROL WORRYING: NOT AT ALL
8. IF YOU CHECKED OFF ANY PROBLEMS, HOW DIFFICULT HAVE THESE MADE IT FOR YOU TO DO YOUR WORK, TAKE CARE OF THINGS AT HOME, OR GET ALONG WITH OTHER PEOPLE?: NOT DIFFICULT AT ALL
3. WORRYING TOO MUCH ABOUT DIFFERENT THINGS: NOT AT ALL

## 2025-04-08 ENCOUNTER — MYC MEDICAL ADVICE (OUTPATIENT)
Dept: FAMILY MEDICINE | Facility: OTHER | Age: 60
End: 2025-04-08
Payer: COMMERCIAL

## 2025-04-08 DIAGNOSIS — F90.2 ATTENTION DEFICIT HYPERACTIVITY DISORDER (ADHD), COMBINED TYPE: ICD-10-CM

## 2025-04-08 RX ORDER — DEXTROAMPHETAMINE SACCHARATE, AMPHETAMINE ASPARTATE, DEXTROAMPHETAMINE SULFATE AND AMPHETAMINE SULFATE 2.5; 2.5; 2.5; 2.5 MG/1; MG/1; MG/1; MG/1
10 TABLET ORAL DAILY
Qty: 30 TABLET | Refills: 0 | OUTPATIENT
Start: 2025-04-08

## 2025-04-08 RX ORDER — DEXTROAMPHETAMINE SACCHARATE, AMPHETAMINE ASPARTATE MONOHYDRATE, DEXTROAMPHETAMINE SULFATE AND AMPHETAMINE SULFATE 5; 5; 5; 5 MG/1; MG/1; MG/1; MG/1
20 CAPSULE, EXTENDED RELEASE ORAL DAILY
Qty: 30 CAPSULE | Refills: 0 | OUTPATIENT
Start: 2025-04-08

## 2025-04-08 NOTE — TELEPHONE ENCOUNTER
Contacted patient and scheduled her for 04/23 for follow up visit with Dr Lao.  She is requesting enough medication to get her to the appointment.

## 2025-04-08 NOTE — TELEPHONE ENCOUNTER
Overdue for needed care. Please call to schedule. Once appt is scheduled, route back to me. Due every 3 months, so needs to schedule follow-up.

## 2025-04-09 RX ORDER — DEXTROAMPHETAMINE SACCHARATE, AMPHETAMINE ASPARTATE MONOHYDRATE, DEXTROAMPHETAMINE SULFATE AND AMPHETAMINE SULFATE 5; 5; 5; 5 MG/1; MG/1; MG/1; MG/1
CAPSULE, EXTENDED RELEASE ORAL
Qty: 30 CAPSULE | Refills: 0 | Status: SHIPPED | OUTPATIENT
Start: 2025-04-09

## 2025-04-09 RX ORDER — DEXTROAMPHETAMINE SACCHARATE, AMPHETAMINE ASPARTATE, DEXTROAMPHETAMINE SULFATE AND AMPHETAMINE SULFATE 2.5; 2.5; 2.5; 2.5 MG/1; MG/1; MG/1; MG/1
10 TABLET ORAL DAILY
Qty: 30 TABLET | Refills: 0 | Status: SHIPPED | OUTPATIENT
Start: 2025-04-09

## 2025-04-23 ENCOUNTER — OFFICE VISIT (OUTPATIENT)
Dept: FAMILY MEDICINE | Facility: OTHER | Age: 60
End: 2025-04-23
Payer: COMMERCIAL

## 2025-04-23 VITALS
WEIGHT: 155 LBS | SYSTOLIC BLOOD PRESSURE: 137 MMHG | BODY MASS INDEX: 26.46 KG/M2 | HEART RATE: 98 BPM | HEIGHT: 64 IN | RESPIRATION RATE: 14 BRPM | TEMPERATURE: 97.9 F | OXYGEN SATURATION: 98 % | DIASTOLIC BLOOD PRESSURE: 87 MMHG

## 2025-04-23 DIAGNOSIS — F90.2 ATTENTION DEFICIT HYPERACTIVITY DISORDER (ADHD), COMBINED TYPE: Primary | ICD-10-CM

## 2025-04-23 PROCEDURE — 99214 OFFICE O/P EST MOD 30 MIN: CPT | Performed by: FAMILY MEDICINE

## 2025-04-23 RX ORDER — DEXTROAMPHETAMINE SACCHARATE, AMPHETAMINE ASPARTATE MONOHYDRATE, DEXTROAMPHETAMINE SULFATE AND AMPHETAMINE SULFATE 5; 5; 5; 5 MG/1; MG/1; MG/1; MG/1
20 CAPSULE, EXTENDED RELEASE ORAL DAILY
Qty: 30 CAPSULE | Refills: 0 | Status: SHIPPED | OUTPATIENT
Start: 2025-05-23 | End: 2025-06-22

## 2025-04-23 RX ORDER — DEXTROAMPHETAMINE SACCHARATE, AMPHETAMINE ASPARTATE MONOHYDRATE, DEXTROAMPHETAMINE SULFATE AND AMPHETAMINE SULFATE 5; 5; 5; 5 MG/1; MG/1; MG/1; MG/1
20 CAPSULE, EXTENDED RELEASE ORAL DAILY
Qty: 30 CAPSULE | Refills: 0 | Status: SHIPPED | OUTPATIENT
Start: 2025-06-22 | End: 2025-07-22

## 2025-04-23 RX ORDER — DEXTROAMPHETAMINE SACCHARATE, AMPHETAMINE ASPARTATE MONOHYDRATE, DEXTROAMPHETAMINE SULFATE AND AMPHETAMINE SULFATE 5; 5; 5; 5 MG/1; MG/1; MG/1; MG/1
20 CAPSULE, EXTENDED RELEASE ORAL DAILY
Qty: 30 CAPSULE | Refills: 0 | Status: SHIPPED | OUTPATIENT
Start: 2025-04-23 | End: 2025-05-23

## 2025-04-23 RX ORDER — DEXTROAMPHETAMINE SACCHARATE, AMPHETAMINE ASPARTATE, DEXTROAMPHETAMINE SULFATE AND AMPHETAMINE SULFATE 2.5; 2.5; 2.5; 2.5 MG/1; MG/1; MG/1; MG/1
10 TABLET ORAL 2 TIMES DAILY
Qty: 60 TABLET | Refills: 0 | Status: SHIPPED | OUTPATIENT
Start: 2025-05-23 | End: 2025-06-22

## 2025-04-23 RX ORDER — DEXTROAMPHETAMINE SACCHARATE, AMPHETAMINE ASPARTATE, DEXTROAMPHETAMINE SULFATE AND AMPHETAMINE SULFATE 2.5; 2.5; 2.5; 2.5 MG/1; MG/1; MG/1; MG/1
10 TABLET ORAL 2 TIMES DAILY
Qty: 60 TABLET | Refills: 0 | Status: SHIPPED | OUTPATIENT
Start: 2025-06-22 | End: 2025-07-22

## 2025-04-23 RX ORDER — DEXTROAMPHETAMINE SACCHARATE, AMPHETAMINE ASPARTATE, DEXTROAMPHETAMINE SULFATE AND AMPHETAMINE SULFATE 2.5; 2.5; 2.5; 2.5 MG/1; MG/1; MG/1; MG/1
10 TABLET ORAL 2 TIMES DAILY
Qty: 60 TABLET | Refills: 0 | Status: SHIPPED | OUTPATIENT
Start: 2025-04-23 | End: 2025-05-23

## 2025-04-23 ASSESSMENT — PAIN SCALES - GENERAL: PAINLEVEL_OUTOF10: NO PAIN (0)

## 2025-04-23 NOTE — PROGRESS NOTES
"  Assessment & Plan         ICD-10-CM    1. Attention deficit hyperactivity disorder (ADHD), combined type  F90.2 amphetamine-dextroamphetamine (ADDERALL XR) 20 MG 24 hr capsule     amphetamine-dextroamphetamine (ADDERALL XR) 20 MG 24 hr capsule     amphetamine-dextroamphetamine (ADDERALL XR) 20 MG 24 hr capsule     amphetamine-dextroamphetamine (ADDERALL) 10 MG tablet     amphetamine-dextroamphetamine (ADDERALL) 10 MG tablet     amphetamine-dextroamphetamine (ADDERALL) 10 MG tablet          Consent was obtained from the patient to use an AI documentation tool in the creation of this note.    Assessment & Plan  Attention deficit hyperactivity disorder (ADHD), combined type  - ADHD management is currently effective without any reported side effects or difficulties in obtaining medication. Blood pressure is trending upwards, which may impact future ADHD medication management.  - Continue current ADHD medication regimen. Monitor cardiovascular health and blood pressure trends. Recommendations include cardiovascular exercise, reducing salt, caffeine, and nicotine intake. Consider scheduling follow-up appointments every three months, either in-office or virtually, ensuring blood pressure is checked prior to virtual visits.  - Discussed the importance of maintaining cardiovascular health to prolong tolerance to stimulant medications. Suggested reducing morning coffee intake to lower caffeine consumption. Emphasized the need to monitor blood pressure regularly, especially before virtual appointments, and to report the readings for assessment.      I spent a total of 33 minutes on the day of the visit.   Time spent by me today doing chart review, history and exam, documentation and further activities per the note    More Lao MD           BMI  Estimated body mass index is 26.82 kg/m  as calculated from the following:    Height as of this encounter: 1.619 m (5' 3.74\").    Weight as of this encounter: 70.3 kg (155 lb). " "  Weight management plan: Discussed healthy diet and exercise guidelines      Follow-up  Return in about 3 months (around 7/23/2025) for adhd.    Subjective   Irma is a 60 year old, presenting for the following health issues:  Recheck Medication        4/23/2025     2:14 PM   Additional Questions   Roomed by magdiel   Accompanied by self     History of Present Illness       Reason for visit:  Follow up    She eats 0-1 servings of fruits and vegetables daily.She consumes 0 sweetened beverage(s) daily.She exercises with enough effort to increase her heart rate 20 to 29 minutes per day.  She exercises with enough effort to increase her heart rate 3 or less days per week.   She is taking medications regularly.   - Blood pressure has been trending up over the years: in the 100s and 110s for years, increased to 120 last year, and 137 this year.  - Undergoing a kitchen renovation, which is causing stress.  - Experienced a high pollen year, leading to runny eyes and nose.  - Forgot to get vaccines last year, but managed to avoid illness during a difficult winter.  - Noted a reminder from friends to take medication during vacation.  SUBJECTIVE:  Chelo is here today to recheck ADHD/ADD.    Updates since last visit: no    Routine for taking medicine, including time: 0900   Time medicine wears off: 1600   Issues at school/work: no  Issues at home: no  Control of symptoms: yes    Side effects:  Headaches: No  Stomach aches: No  Irritability/mood swings: No  Difficulties with sleep: No  Social withdrawal: No  Unusual movements/tics: No  Decreased appetite: Yes maybe     Other concerns: no        Review of Systems  Constitutional, HEENT, cardiovascular, pulmonary, GI, , musculoskeletal, neuro, skin, endocrine and psych systems are negative, except as otherwise noted.      Objective    /87   Pulse 98   Temp 97.9  F (36.6  C) (Temporal)   Resp 14   Ht 1.619 m (5' 3.74\")   Wt 70.3 kg (155 lb)   LMP 03/07/2011   SpO2 98% "   BMI 26.82 kg/m    Body mass index is 26.82 kg/m .  Physical Exam   GENERAL: alert and no distress  RESP: lungs clear to auscultation - no rales, rhonchi or wheezes  CV: regular rate and rhythm, normal S1 S2, no S3 or S4, no murmur, click or rub, no peripheral edema  MS: no gross musculoskeletal defects noted, no edema  SKIN: no suspicious lesions or rashes  NEURO: Normal strength and tone, mentation intact and speech normal  PSYCH: mentation appears normal, affect normal/bright            Signed Electronically by: More Lao MD, MD

## 2025-08-05 ENCOUNTER — PATIENT OUTREACH (OUTPATIENT)
Dept: CARE COORDINATION | Facility: CLINIC | Age: 60
End: 2025-08-05
Payer: COMMERCIAL

## 2025-08-19 ENCOUNTER — PATIENT OUTREACH (OUTPATIENT)
Dept: CARE COORDINATION | Facility: CLINIC | Age: 60
End: 2025-08-19
Payer: COMMERCIAL

## (undated) DEVICE — LINEN TOWEL PACK X5 5464

## (undated) DEVICE — SOL WATER IRRIG 1000ML BOTTLE 07139-09

## (undated) DEVICE — DRAIN JACKSON PRATT 15FR ROUND SU130-1323

## (undated) DEVICE — TUBING SUCTION LIPECTOMY

## (undated) DEVICE — SU ETHILON 5-0 P-3 18" BLACK 698G

## (undated) DEVICE — CLIP APPLIER 11.5" PREMIUM II 134053

## (undated) DEVICE — SU PLAIN FAST ABSORB 5-0 PC-1 18" 1915G

## (undated) DEVICE — SU ETHILON 3-0 FS-1 18" 669H

## (undated) DEVICE — STPL SKIN 35W APPOSE 8886803712

## (undated) DEVICE — APPLICATOR COTTON TIP 6"X2 STERILE LF 6012

## (undated) DEVICE — PREP SKIN SCRUB TRAY 4461A

## (undated) DEVICE — DRAPE BREAST/CHEST 29420

## (undated) DEVICE — PAD CHUX UNDERPAD 23X24" 7136

## (undated) DEVICE — SUCTION CANISTER MEDIVAC LINER 3000ML W/LID 65651-530

## (undated) DEVICE — SU VICRYL 3-0 SH 27" J316H

## (undated) DEVICE — PACK MAJOR SBA15MAFSI

## (undated) DEVICE — SPONGE LAP 18X18" X8435

## (undated) DEVICE — GLOVE PROTEXIS BLUE W/NEU-THERA 6.5  2D73EB65

## (undated) DEVICE — DRSG GAUZE 4X4" 3033

## (undated) DEVICE — BLADE KNIFE SURG 20 371120

## (undated) DEVICE — BLANKET BAIR HUGGER LOWER BODY 42568

## (undated) DEVICE — CATH TRAY FOLEY SURESTEP 16FR WDRAIN BAG STLK LATEX A300316A

## (undated) DEVICE — DRAIN JACKSON PRATT RESERVOIR 100ML SU130-1305

## (undated) DEVICE — DRSG XEROFORM 5X9" 8884431605

## (undated) DEVICE — SYR 50ML CATH TIP W/O NDL 309620

## (undated) DEVICE — SU MONOCRYL 3-0 PS-2 27" Y427H

## (undated) DEVICE — SU MONOCRYL 4-0 PS-2 18" UND Y496G

## (undated) DEVICE — SU PDS II 0 CT 36" Z358T

## (undated) DEVICE — TUBING INFUSION INFILTRATION LIPOSUCTION 156" 24-6008

## (undated) DEVICE — PEN MARKING SKIN W/LABELS 31145884

## (undated) DEVICE — SUCTION CANISTER 1200ML

## (undated) DEVICE — SU NUROLON 0 CT-1 CR 8X18" C521D

## (undated) RX ORDER — ACETAMINOPHEN 500 MG
TABLET ORAL
Status: DISPENSED
Start: 2018-03-13

## (undated) RX ORDER — OXYCODONE HCL 10 MG/1
TABLET, FILM COATED, EXTENDED RELEASE ORAL
Status: DISPENSED
Start: 2018-03-13

## (undated) RX ORDER — ONDANSETRON 2 MG/ML
INJECTION INTRAMUSCULAR; INTRAVENOUS
Status: DISPENSED
Start: 2018-03-13

## (undated) RX ORDER — KETOROLAC TROMETHAMINE 30 MG/ML
INJECTION, SOLUTION INTRAMUSCULAR; INTRAVENOUS
Status: DISPENSED
Start: 2018-03-13

## (undated) RX ORDER — GLYCOPYRROLATE 0.2 MG/ML
INJECTION, SOLUTION INTRAMUSCULAR; INTRAVENOUS
Status: DISPENSED
Start: 2018-03-13

## (undated) RX ORDER — PROPOFOL 10 MG/ML
INJECTION, EMULSION INTRAVENOUS
Status: DISPENSED
Start: 2018-03-13

## (undated) RX ORDER — LIDOCAINE HYDROCHLORIDE 20 MG/ML
INJECTION, SOLUTION EPIDURAL; INFILTRATION; INTRACAUDAL; PERINEURAL
Status: DISPENSED
Start: 2018-03-13

## (undated) RX ORDER — HYDROMORPHONE HYDROCHLORIDE 1 MG/ML
INJECTION, SOLUTION INTRAMUSCULAR; INTRAVENOUS; SUBCUTANEOUS
Status: DISPENSED
Start: 2018-03-13

## (undated) RX ORDER — OXYCODONE HYDROCHLORIDE 5 MG/1
TABLET ORAL
Status: DISPENSED
Start: 2018-03-13

## (undated) RX ORDER — FENTANYL CITRATE 50 UG/ML
INJECTION, SOLUTION INTRAMUSCULAR; INTRAVENOUS
Status: DISPENSED
Start: 2018-03-13

## (undated) RX ORDER — GABAPENTIN 300 MG/1
CAPSULE ORAL
Status: DISPENSED
Start: 2018-03-13

## (undated) RX ORDER — CEFAZOLIN SODIUM 1 G/3ML
INJECTION, POWDER, FOR SOLUTION INTRAMUSCULAR; INTRAVENOUS
Status: DISPENSED
Start: 2018-03-13

## (undated) RX ORDER — DEXAMETHASONE SODIUM PHOSPHATE 4 MG/ML
INJECTION, SOLUTION INTRA-ARTICULAR; INTRALESIONAL; INTRAMUSCULAR; INTRAVENOUS; SOFT TISSUE
Status: DISPENSED
Start: 2018-03-13

## (undated) RX ORDER — CEFAZOLIN SODIUM 2 G/100ML
INJECTION, SOLUTION INTRAVENOUS
Status: DISPENSED
Start: 2018-03-13